# Patient Record
Sex: MALE | Race: WHITE | Employment: OTHER | URBAN - METROPOLITAN AREA
[De-identification: names, ages, dates, MRNs, and addresses within clinical notes are randomized per-mention and may not be internally consistent; named-entity substitution may affect disease eponyms.]

---

## 2017-04-20 ENCOUNTER — HOSPITAL ENCOUNTER (OUTPATIENT)
Dept: PREADMISSION TESTING | Age: 82
Discharge: HOME | End: 2017-04-20
Payer: MEDICARE

## 2017-04-20 VITALS — WEIGHT: 239 LBS | BODY MASS INDEX: 34.22 KG/M2 | HEIGHT: 70 IN

## 2017-04-20 LAB
ANION GAP BLD CALC-SCNC: 11 MMOL/L (ref 3–18)
ATRIAL RATE: 94 BPM
BUN SERPL-MCNC: 21 MG/DL (ref 7–18)
BUN/CREAT SERPL: 18 (ref 12–20)
CALCIUM SERPL-MCNC: 9.2 MG/DL (ref 8.5–10.1)
CALCULATED R AXIS, ECG10: 18 DEGREES
CALCULATED T AXIS, ECG11: 46 DEGREES
CHLORIDE SERPL-SCNC: 102 MMOL/L (ref 100–108)
CO2 SERPL-SCNC: 26 MMOL/L (ref 21–32)
CREAT SERPL-MCNC: 1.18 MG/DL (ref 0.6–1.3)
DIAGNOSIS, 93000: NORMAL
ERYTHROCYTE [DISTWIDTH] IN BLOOD BY AUTOMATED COUNT: 20.9 % (ref 11.6–14.5)
EST. AVERAGE GLUCOSE BLD GHB EST-MCNC: 120 MG/DL
GLUCOSE SERPL-MCNC: 73 MG/DL (ref 74–99)
HBA1C MFR BLD: 5.8 % (ref 4.5–5.6)
HCT VFR BLD AUTO: 48.3 % (ref 36–48)
HGB BLD-MCNC: 15.1 G/DL (ref 13–16)
MCH RBC QN AUTO: 28.4 PG (ref 24–34)
MCHC RBC AUTO-ENTMCNC: 31.3 G/DL (ref 31–37)
MCV RBC AUTO: 90.8 FL (ref 74–97)
PLATELET # BLD AUTO: 258 K/UL (ref 135–420)
PMV BLD AUTO: 9.5 FL (ref 9.2–11.8)
POTASSIUM SERPL-SCNC: 4.9 MMOL/L (ref 3.5–5.5)
Q-T INTERVAL, ECG07: 364 MS
QRS DURATION, ECG06: 106 MS
QTC CALCULATION (BEZET), ECG08: 435 MS
RBC # BLD AUTO: 5.32 M/UL (ref 4.7–5.5)
SODIUM SERPL-SCNC: 139 MMOL/L (ref 136–145)
VENTRICULAR RATE, ECG03: 86 BPM
WBC # BLD AUTO: 10.2 K/UL (ref 4.6–13.2)

## 2017-04-20 PROCEDURE — 93005 ELECTROCARDIOGRAM TRACING: CPT

## 2017-04-20 PROCEDURE — 83036 HEMOGLOBIN GLYCOSYLATED A1C: CPT | Performed by: ANESTHESIOLOGY

## 2017-04-20 PROCEDURE — 85027 COMPLETE CBC AUTOMATED: CPT | Performed by: ORTHOPAEDIC SURGERY

## 2017-04-20 PROCEDURE — 80048 BASIC METABOLIC PNL TOTAL CA: CPT | Performed by: ORTHOPAEDIC SURGERY

## 2017-04-20 RX ORDER — LOSARTAN POTASSIUM 50 MG/1
50 TABLET ORAL DAILY
COMMUNITY

## 2017-04-20 RX ORDER — LORAZEPAM 0.5 MG/1
0.5 TABLET ORAL
COMMUNITY

## 2017-04-20 RX ORDER — LANOLIN ALCOHOL/MO/W.PET/CERES
65 CREAM (GRAM) TOPICAL
COMMUNITY

## 2017-04-20 RX ORDER — GLIMEPIRIDE 1 MG/1
0.5 TABLET ORAL
COMMUNITY

## 2017-04-20 RX ORDER — CITALOPRAM 10 MG/1
10 TABLET ORAL
COMMUNITY

## 2017-04-20 RX ORDER — TIMOLOL MALEATE 5 MG/ML
1 SOLUTION/ DROPS OPHTHALMIC 2 TIMES DAILY
COMMUNITY

## 2017-04-20 RX ORDER — LEVOTHYROXINE SODIUM 100 UG/1
100 TABLET ORAL
COMMUNITY

## 2017-04-20 NOTE — PERIOP NOTES
Dr Ho Plant office called. Left message on Emilee Ascencio voicemail re:  Need a written note from Dr Zayda Valle about management instructions for Eliquis.

## 2017-04-25 PROBLEM — N40.0 BPH (BENIGN PROSTATIC HYPERPLASIA): Chronic | Status: ACTIVE | Noted: 2017-04-25

## 2017-04-25 PROBLEM — I48.91 A-FIB (HCC): Chronic | Status: ACTIVE | Noted: 2017-04-25

## 2017-04-25 PROBLEM — Z87.11 HISTORY OF GASTRIC ULCER: Chronic | Status: ACTIVE | Noted: 2017-04-25

## 2017-04-25 PROBLEM — E03.9 ACQUIRED HYPOTHYROIDISM: Chronic | Status: ACTIVE | Noted: 2017-04-25

## 2017-04-25 PROBLEM — C90.00 MULTIPLE MYELOMA (HCC): Chronic | Status: ACTIVE | Noted: 2017-04-25

## 2017-04-25 PROBLEM — D51.0 PERNICIOUS ANEMIA: Chronic | Status: ACTIVE | Noted: 2017-04-25

## 2017-04-25 PROBLEM — G56.01 CARPAL TUNNEL SYNDROME OF RIGHT WRIST: Chronic | Status: ACTIVE | Noted: 2017-04-25

## 2017-04-25 PROBLEM — I10 HTN (HYPERTENSION): Chronic | Status: ACTIVE | Noted: 2017-04-25

## 2017-04-25 PROBLEM — G62.9 PERIPHERAL NEUROPATHY: Chronic | Status: ACTIVE | Noted: 2017-04-25

## 2017-04-25 PROBLEM — E11.9 DIABETES MELLITUS TYPE 2, CONTROLLED (HCC): Chronic | Status: ACTIVE | Noted: 2017-04-25

## 2017-04-25 NOTE — H&P
History and Physical        Patient: Ej Ugarte               Sex: male          DOA: (Not on file)         YOB: 1935      Age:  [de-identified] y.o.        LOS:  LOS: 0 days        HPI:     Trever is in for followup of right-greater-than-left carpal tunnel syndrome/severe 1st [facility] DJD/generalized hand DJD/stiffness/left volar ganglion cyst/evidence of mild cubital tunnel syndrome, but he does not have this clinical symptom. AP, lateral, and oblique views of the bilateral hands were obtained and interpreted in the office and do show the obvious generalized osteoarthritis throughout both hands, worse at the 1st [facility] joint. Otherwise, there is no periosteal reaction, no medullary lesions, no osteopenia, no chondrolysis, and no fractures.     Past Medical History:   Diagnosis Date    Arthritis     Atrial fibrillation (HCC)     afib    Chronic pain     lower back    Colon cancer screening 5/3/2012    Diabetes ([facility] [address] 10yrs    GERD (gastroesophageal reflux disease)     Hypertension 10yrs    Other ill-defined conditions     enlarged prostate    Pernicious anemia     Psychiatric disorder     anxiety    PUD (peptic ulcer disease)     bleeding ulcer    Stroke ([facility] [address] 2015    TIA     Thyroid disease     underactive       Past Surgical History:   Procedure Laterality Date    ABDOMEN SURGERY PROC UNLISTED  1967    bleeding ulcer    CARDIAC SURG PROCEDURE UNLIST  11/16//09    heart cath, ablation    CARDIAC SURG PROCEDURE UNLIST  1/19/2010    cardioversion    HX CATARACT REMOVAL  6/102008    rt eye cataract    HX CATARACT REMOVAL  7/8/2008    lt eye cataract    HX GI  1967    bleeding ulcer    HX HEENT  10/1956    boken nose    HX HEENT  8/1958    boken jaw    HX HEENT      wisdom teeth    HX KNEE ARTHROSCOPY      right knee surgery x5    HX KNEE ARTHROSCOPY      left knee surgrey    HX ORTHOPAEDIC  9/2006    left shoulder  torn ligament    HX ORTHOPAEDIC  jan 2013    right foot, right toe clip tendon    HX ORTHOPAEDIC  nov 1959    broken right ankle    HX OTHER SURGICAL  2011    3 cysts infected on back       No family history on file. Social History     Social History    Marital status:      Spouse name: N/A    Number of children: N/A    Years of education: N/A     Social History Main Topics    Smoking status: Never Smoker    Smokeless tobacco: Never Used    Alcohol use No    Drug use: No    Sexual activity: Yes     Other Topics Concern    Not on file     Social History Narrative       Prior to Admission medications    Medication Sig Start Date End Date Taking? Authorizing Provider   levothyroxine (SYNTHROID) 100 mcg tablet Take 100 mcg by mouth Daily (before breakfast). Historical Provider   DOCOSAHEXANOIC ACID/EPA (FISH OIL PO) Take 1,200 mg by mouth every Monday, Wednesday, Friday. Historical Provider   TESTOSTERONE CYPIONATE IM 1.5 mL by IntraMUSCular route Once every 2 weeks. Historical Provider   LORazepam (ATIVAN) 0.5 mg tablet Take 0.5 mg by mouth daily as needed for Anxiety. Historical Provider   apixaban (ELIQUIS) 5 mg tablet Take 5 mg by mouth two (2) times a day. Historical Provider   glimepiride (AMARYL) 1 mg tablet Take 0.5 mg by mouth Daily (before breakfast). Historical Provider   losartan (COZAAR) 50 mg tablet Take 50 mg by mouth daily. Historical Provider   citalopram (CELEXA) 10 mg tablet Take 10 mg by mouth daily. Historical Provider   timolol (TIMOPTIC) 0.5 % ophthalmic solution Administer 1 Drop to right eye two (2) times a day. Historical Provider   ferrous sulfate (IRON) 325 mg (65 mg iron) tablet Take 65 mg by mouth every Monday, Wednesday, Friday. Historical Provider   pravastatin (PRAVACHOL) 40 mg tablet Take 40 mg by mouth nightly. Historical Provider   TRAVOPROST (TRAVATAN Z OP) Apply 1 Drop to eye nightly.  Both eyes    Historical Provider   acetaminophen (TYLENOL EXTRA STRENGTH) 500 mg tablet Take 1,000 mg by mouth two (2) times a day. Historical Provider   BIOTIN PO Take 1,000 mg by mouth daily. Historical Provider   lactobacillus rhamnosus gg 10 billion cell (PROBIOTIC) 10 billion cell capsule Take 1 Cap by mouth every Monday, Wednesday, Friday. Historical Provider   metFORMIN (GLUCOPHAGE) 500 mg tablet Take 1,000 mg by mouth two (2) times daily (with meals). Indications: TYPE 2 DIABETES MELLITUS    Historical Provider   omeprazole (PRILOSEC) 20 mg capsule Take 20 mg by mouth daily. Indications: GASTROESOPHAGEAL REFLUX    Historical Provider   ergocalciferol (VITAMIN D2) 50,000 unit capsule Take 50,000 Units by mouth Every Saturday. Historical Provider   celecoxib (CELEBREX) 200 mg capsule Take 200 mg by mouth daily. Historical Provider   cyanocobalamin (VITAMIN B-12) 1,000 mcg tablet 2,500 mcg by SubLINGual route daily. Historical Provider   GLUCOSAMINE HCL/CHONDR HASTINGS A NA (GLUCOSAMINE-CHONDROITIN) 750-600 mg Tab Take 2 Tabs by mouth daily. Historical Provider   coenzyme q10-vitamin e (COQ10 ) 100-100 mg-unit Cap Take 300 mg by mouth daily. Historical Provider       Allergies   Allergen Reactions    Feldene [Piroxicam] Photosensitivity    Monosodium Glutamate Nausea and Vomiting     Pt states he went into a-fib too    Prednisone Palpitations     Elevated blood sugar very high and went into a-fib    Mercurochrome [Merbromin] Rash    Adhesive Rash    Cymbalta [Duloxetine] Unknown (comments)    Oxycodone Other (comments)     Decreased apetite, insomnia, poor concentration, lasting 3 weeks after the last dose        Review of Systems    Pertinent positives include cold, anxiety, frequent urination, indigestion, joint pain, joint stiffness, nausea/vomiting, numbness/tingling, shortness of breath and varicose veins.   Pertinent negatives include blurred vision, chest pain, chills, discharge of the eyes, dizziness, double vision, fever, headache, hearing loss, heart murmur, itching of the eyes, palpitations, redness of the eyes, rheumatic fever, ringing in ears, sore throat/hoarseness, weight change, abdominal pain, bipolar disorder, bladder/kidney infection, bloody stool, blood in urine, burning sensation, changes in mood, chronic cough, depression, diarrhea, difficulty breathing, difficulty swallowing, fainting, fracture/dislocation, gas/bloating, gout, hemorrhoids, incontinence, loss of balance, memory loss, muscle weakness, pain on breathing, painful urination, psoriasis, rash/itching, Raynaud's phenomenon, rheumatoid disease, seizure disorder, sprain/strain, swelling of feet, tendonitis and wheezing. Physical Exam:      There were no vitals taken for this visit. Physical Exam:  Physical exam shows a healthy-appearing, elderly white male. The right hand has obvious significant osteoarthritic change with Alyse's and Heberden's nodes. There is an adduction thumb deformity with mild pain at the 1st [facility]. The adduction thumb deformity is associated with his arthritis, and there is some decreased wrist motion as well. Otherwise, examination shows that the patients right wrist demonstrates no obvious swelling or ecchymosis. The patient has a negative Tinel, Phalen, and direct carpal compression tests. The patient has a negative Finkelstein maneuver. The patient has a 2+ radial pulse. The patient has good  strength of the hand with normal thenar eminence tone and normal light-touch sensation at the tip of each digit.       Assessment/Plan     Principal Problem:    Carpal tunnel syndrome of right wrist (4/25/2017)    Active Problems:    Multiple myeloma ([address] (4/25/2017)      A-fib ([address] (4/25/2017)      Diabetes mellitus type 2, controlled ([address] (4/25/2017)      BPH (benign prostatic hyperplasia) (4/25/2017)      Acquired hypothyroidism (4/25/2017)      Pernicious anemia (4/25/2017)      Peripheral neuropathy ([address] (4/25/2017)      HTN (hypertension) (4/25/2017)      History of gastric ulcer (4/25/2017)      If he has continued problems with the ganglion cyst then Dr. Esthela Rain would consider getting an MRI of his wrist for surgical removal because it does have intimate association with the radial artery. In regards to the right hand, Dr. Esthela Rain talked to him about a right endoscopic carpal tunnel release. We are going to try to do this at [facility] in the near future. He issued Norco for postoperative pain use. Dr. Esthela Rain will see him again one week postop. He understands all the same risks and is willing to proceed. Once we get the right side better enough, we will discuss the left. We do not need to plan for anything involved with his arthritic change in his hands for now.

## 2017-04-26 RX ORDER — SODIUM CHLORIDE 0.9 % (FLUSH) 0.9 %
5-10 SYRINGE (ML) INJECTION AS NEEDED
Status: CANCELLED | OUTPATIENT
Start: 2017-04-26

## 2017-04-26 RX ORDER — DIPHENHYDRAMINE HCL 25 MG
25 CAPSULE ORAL
Status: CANCELLED | OUTPATIENT
Start: 2017-04-26

## 2017-04-26 RX ORDER — SODIUM CHLORIDE 0.9 % (FLUSH) 0.9 %
5-10 SYRINGE (ML) INJECTION EVERY 8 HOURS
Status: CANCELLED | OUTPATIENT
Start: 2017-04-26

## 2017-04-27 ENCOUNTER — ANESTHESIA EVENT (OUTPATIENT)
Dept: SURGERY | Age: 82
End: 2017-04-27
Payer: MEDICARE

## 2017-04-27 ENCOUNTER — ANESTHESIA (OUTPATIENT)
Dept: SURGERY | Age: 82
End: 2017-04-27
Payer: MEDICARE

## 2017-04-27 ENCOUNTER — HOSPITAL ENCOUNTER (OUTPATIENT)
Age: 82
Setting detail: OUTPATIENT SURGERY
Discharge: HOME | End: 2017-04-27
Attending: ORTHOPAEDIC SURGERY | Admitting: ORTHOPAEDIC SURGERY
Payer: MEDICARE

## 2017-04-27 VITALS
SYSTOLIC BLOOD PRESSURE: 125 MMHG | TEMPERATURE: 97.7 F | DIASTOLIC BLOOD PRESSURE: 63 MMHG | HEART RATE: 52 BPM | BODY MASS INDEX: 33.53 KG/M2 | WEIGHT: 234.2 LBS | OXYGEN SATURATION: 96 % | RESPIRATION RATE: 18 BRPM | HEIGHT: 70 IN

## 2017-04-27 LAB
GLUCOSE BLD STRIP.AUTO-MCNC: 116 MG/DL (ref 70–110)
GLUCOSE BLD STRIP.AUTO-MCNC: 129 MG/DL (ref 70–110)

## 2017-04-27 PROCEDURE — 74011250636 HC RX REV CODE- 250/636: Performed by: PHYSICIAN ASSISTANT

## 2017-04-27 PROCEDURE — 74011000250 HC RX REV CODE- 250

## 2017-04-27 PROCEDURE — 74011250636 HC RX REV CODE- 250/636

## 2017-04-27 PROCEDURE — 76010000154 HC OR TIME FIRST 0.5 HR: Performed by: ORTHOPAEDIC SURGERY

## 2017-04-27 PROCEDURE — 77030009770 HC INSTRU CRPL SET CNMD -C: Performed by: ORTHOPAEDIC SURGERY

## 2017-04-27 PROCEDURE — 77030012508 HC MSK AIRWY LMA AMBU -A: Performed by: SPECIALIST

## 2017-04-27 PROCEDURE — 74011000250 HC RX REV CODE- 250: Performed by: ORTHOPAEDIC SURGERY

## 2017-04-27 PROCEDURE — 82962 GLUCOSE BLOOD TEST: CPT

## 2017-04-27 PROCEDURE — 76210000006 HC OR PH I REC 0.5 TO 1 HR: Performed by: ORTHOPAEDIC SURGERY

## 2017-04-27 PROCEDURE — 77030020782 HC GWN BAIR PAWS FLX 3M -B: Performed by: ORTHOPAEDIC SURGERY

## 2017-04-27 PROCEDURE — 76060000031 HC ANESTHESIA FIRST 0.5 HR: Performed by: ORTHOPAEDIC SURGERY

## 2017-04-27 PROCEDURE — 76210000021 HC REC RM PH II 0.5 TO 1 HR: Performed by: ORTHOPAEDIC SURGERY

## 2017-04-27 RX ORDER — FENTANYL CITRATE 50 UG/ML
25 INJECTION, SOLUTION INTRAMUSCULAR; INTRAVENOUS
Status: DISCONTINUED | OUTPATIENT
Start: 2017-04-27 | End: 2017-04-27 | Stop reason: HOSPADM

## 2017-04-27 RX ORDER — LIDOCAINE HYDROCHLORIDE 20 MG/ML
INJECTION, SOLUTION EPIDURAL; INFILTRATION; INTRACAUDAL; PERINEURAL AS NEEDED
Status: DISCONTINUED | OUTPATIENT
Start: 2017-04-27 | End: 2017-04-27 | Stop reason: HOSPADM

## 2017-04-27 RX ORDER — SODIUM CHLORIDE, SODIUM LACTATE, POTASSIUM CHLORIDE, CALCIUM CHLORIDE 600; 310; 30; 20 MG/100ML; MG/100ML; MG/100ML; MG/100ML
100 INJECTION, SOLUTION INTRAVENOUS CONTINUOUS
Status: DISCONTINUED | OUTPATIENT
Start: 2017-04-27 | End: 2017-04-27 | Stop reason: HOSPADM

## 2017-04-27 RX ORDER — FENTANYL CITRATE 50 UG/ML
INJECTION, SOLUTION INTRAMUSCULAR; INTRAVENOUS AS NEEDED
Status: DISCONTINUED | OUTPATIENT
Start: 2017-04-27 | End: 2017-04-27 | Stop reason: HOSPADM

## 2017-04-27 RX ORDER — HYDROCODONE BITARTRATE AND ACETAMINOPHEN 5; 325 MG/1; MG/1
1-2 TABLET ORAL
Qty: 40 TAB | Refills: 0 | Status: SHIPPED
Start: 2017-04-27 | End: 2017-09-18

## 2017-04-27 RX ORDER — DEXTROSE 50 % IN WATER (D50W) INTRAVENOUS SYRINGE
25-50 AS NEEDED
Status: DISCONTINUED | OUTPATIENT
Start: 2017-04-27 | End: 2017-04-27 | Stop reason: HOSPADM

## 2017-04-27 RX ORDER — ONDANSETRON 2 MG/ML
INJECTION INTRAMUSCULAR; INTRAVENOUS AS NEEDED
Status: DISCONTINUED | OUTPATIENT
Start: 2017-04-27 | End: 2017-04-27 | Stop reason: HOSPADM

## 2017-04-27 RX ORDER — INSULIN LISPRO 100 [IU]/ML
INJECTION, SOLUTION INTRAVENOUS; SUBCUTANEOUS ONCE
Status: DISCONTINUED | OUTPATIENT
Start: 2017-04-27 | End: 2017-04-27 | Stop reason: HOSPADM

## 2017-04-27 RX ORDER — SODIUM CHLORIDE, SODIUM LACTATE, POTASSIUM CHLORIDE, CALCIUM CHLORIDE 600; 310; 30; 20 MG/100ML; MG/100ML; MG/100ML; MG/100ML
125 INJECTION, SOLUTION INTRAVENOUS CONTINUOUS
Status: DISCONTINUED | OUTPATIENT
Start: 2017-04-27 | End: 2017-04-27 | Stop reason: HOSPADM

## 2017-04-27 RX ORDER — NALOXONE HYDROCHLORIDE 0.4 MG/ML
0.2 INJECTION, SOLUTION INTRAMUSCULAR; INTRAVENOUS; SUBCUTANEOUS AS NEEDED
Status: DISCONTINUED | OUTPATIENT
Start: 2017-04-27 | End: 2017-04-27 | Stop reason: HOSPADM

## 2017-04-27 RX ORDER — SODIUM CHLORIDE 0.9 % (FLUSH) 0.9 %
5-10 SYRINGE (ML) INJECTION AS NEEDED
Status: DISCONTINUED | OUTPATIENT
Start: 2017-04-27 | End: 2017-04-27 | Stop reason: HOSPADM

## 2017-04-27 RX ORDER — BUPIVACAINE HYDROCHLORIDE 2.5 MG/ML
INJECTION, SOLUTION EPIDURAL; INFILTRATION; INTRACAUDAL AS NEEDED
Status: DISCONTINUED | OUTPATIENT
Start: 2017-04-27 | End: 2017-04-27 | Stop reason: HOSPADM

## 2017-04-27 RX ORDER — MAGNESIUM SULFATE 100 %
4 CRYSTALS MISCELLANEOUS AS NEEDED
Status: DISCONTINUED | OUTPATIENT
Start: 2017-04-27 | End: 2017-04-27 | Stop reason: HOSPADM

## 2017-04-27 RX ORDER — PROPOFOL 10 MG/ML
INJECTION, EMULSION INTRAVENOUS AS NEEDED
Status: DISCONTINUED | OUTPATIENT
Start: 2017-04-27 | End: 2017-04-27 | Stop reason: HOSPADM

## 2017-04-27 RX ADMIN — LIDOCAINE HYDROCHLORIDE 100 MG: 20 INJECTION, SOLUTION EPIDURAL; INFILTRATION; INTRACAUDAL; PERINEURAL at 08:57

## 2017-04-27 RX ADMIN — FENTANYL CITRATE 25 MCG: 50 INJECTION, SOLUTION INTRAMUSCULAR; INTRAVENOUS at 09:17

## 2017-04-27 RX ADMIN — FENTANYL CITRATE 50 MCG: 50 INJECTION, SOLUTION INTRAMUSCULAR; INTRAVENOUS at 08:53

## 2017-04-27 RX ADMIN — SODIUM CHLORIDE, SODIUM LACTATE, POTASSIUM CHLORIDE, AND CALCIUM CHLORIDE 125 ML/HR: 600; 310; 30; 20 INJECTION, SOLUTION INTRAVENOUS at 07:47

## 2017-04-27 RX ADMIN — PROPOFOL 150 MG: 10 INJECTION, EMULSION INTRAVENOUS at 08:57

## 2017-04-27 RX ADMIN — PROPOFOL 20 MG: 10 INJECTION, EMULSION INTRAVENOUS at 08:58

## 2017-04-27 RX ADMIN — FENTANYL CITRATE 25 MCG: 50 INJECTION, SOLUTION INTRAMUSCULAR; INTRAVENOUS at 09:13

## 2017-04-27 RX ADMIN — ONDANSETRON 4 MG: 2 INJECTION INTRAMUSCULAR; INTRAVENOUS at 08:53

## 2017-04-27 NOTE — OP NOTES
Endoscopic Carpal Tunnel Release    Patient: Queta Anderson MRN: [id number]  CSN: [id number]   YOB: 1935  Age: [de-identified] y.o. Sex: male      Date of Surgery:4/27/2017    PREOPERATIVE DIAGNOSES:  CARPAL TUNNEL      POSTOPERATIVE DIAGNOSES:  CARPAL TUNNEL    PROCEDURE: right Endoscpoic Carpal Tunnel Release    SURGEON: Wallace Cooper MD    SPECIMEN TO PATHOLOGY:  * No specimens in log *    ESTIMATED BLOOD LOSS: none    FINDINGS:  Same     TOURNIQUET  TIME:   Approximately 5 minutes at 613 Hg     COMPLICATIONS:  None     ANESTHESIA:  General    INDICATIONS:  A [de-identified] y.o.  male with known carpal tunnel syndrome documented by clinical exam and nerve conduction studies. The patient now presents for a ECTR. PROCEDURE: The patient was brought into the operating theater and after adequate prepping and draping of the surgical wrist and hand the limb was then exsanguinated with an Esmarch bandage and the tourniquet was inflated. .  A 1 cm transverse incision was placed at the volar flexion wrist crease centered over the palmaris longus. The incision was deepened to the antebrachial fascia which was released the length of the wound and proximally for an additional 1 cm. Using the [employer] carpal tunnel release kit, the carpal tunnel was dilated in line with the fourth metacarpal to the distal extent of the transverse carpal ligament. The cannula was then inserted and kept on some proximal radial deviation with the wrist in slight extension, and it was passed all the way to the cardinal line of Cm. The scope was then inserted with the wrist in slight extension and the undersurface of the transverse carpal ligament was seen easily from proximal to the distal fatty/adipose tissue at the end of the transverse carpal ligament.   Using the [facility] on the [facility] set and using the endoscope for visualization, the transverse carpal ligament was transected in two passes with good release of the ligament outside of view of the cannula. The nerve was never in harms way. The cannula was then withdrawn and placed back in the carpal tunnel with none of the prerelease tension. The wound was then closed with Mastisol on either side and then Steri-Strips were used to re-oppose the skin without the use of stitches. The skin and cut ends of the transverse carpal ligament were anesthetized with 4 to 5 mL of 0.25% Marcaine without epinephrine. This was dressed with two 4 x 4s and an Ace wrap. The tourniquet was deflated and removed, and the patient taken to the recovery room awake and in stable condition. All instrument, sponge and needle counts were correct.     Wallace Cooper MD  4/27/2017

## 2017-04-27 NOTE — PERIOP NOTES
[facility] med list reviewed and verified, no duplicates, with MICHAEL Robert RN.

## 2017-04-27 NOTE — INTERVAL H&P NOTE
H&P Update:  Cha Berry was seen and examined. History and physical has been reviewed. The patient has been examined. There have been no significant clinical changes since the completion of the originally dated History and Physical.  Patient identified by surgeon; surgical site was confirmed by patient and surgeon.     Signed By: Alexys Wise MD     April 27, 2017 8:38 AM

## 2017-04-27 NOTE — ANESTHESIA PREPROCEDURE EVALUATION
Anesthetic History   No history of anesthetic complications            Review of Systems / Medical History  Patient summary reviewed, nursing notes reviewed and pertinent labs reviewed    Pulmonary  Within defined limits                 Neuro/Psych       CVA  TIA and psychiatric history     Cardiovascular    Hypertension: well controlled        Dysrhythmias : atrial fibrillation           GI/Hepatic/Renal     GERD: well controlled      PUD     Endo/Other    Diabetes  Hypothyroidism  Arthritis     Other Findings              Physical Exam    Airway  Mallampati: I  TM Distance: 4 - 6 cm  Neck ROM: normal range of motion   Mouth opening: Normal     Cardiovascular    Rhythm: irregular  Rate: normal         Dental    Dentition: Caps/crowns     Pulmonary  Breath sounds clear to auscultation               Abdominal  GI exam deferred       Other Findings            Anesthetic Plan    ASA: 3  Anesthesia type: general          Induction: Intravenous  Anesthetic plan and risks discussed with: Patient

## 2017-04-27 NOTE — DISCHARGE INSTRUCTIONS
Madie Tirado III, MD Doneen Mater, PA-C    Upper Extremity Surgery  Discharge Instructions      Please take the time to review the following instructions before you leave the hospital and use them as guidelines during your recovery from surgery. If you have any questions you may contact my office at [phone]. Wound Care/Dressing Changes:    []   You may remove the bulky dressing two days after surgery. Once you remove this, no dressing is necessary if there is no drainage. [x]   You may change your dressing as needed. Beginning the 2 days after you are discharged from the hospital you should change your dressing daily. A big, bulky dressing isnt necessary as long as there is any drainage from the incisions. You can put a band-aid or a piece of gauze over each incision and wear an ACE bandage as needed for comfort and swelling. []   Dont remove your dressing or get them wet.  It isnt necessary to apply antibiotic ointment to your incisions. Sutures will be removed at your one week post-op visit. Staples (if you have them) are removed in two weeks. If you have steri-strips over your incision they will start to peel off in 7-10 days as you get them wet. They dont need to be removed prior to that. When they begin to peel off, you may remove them. They should all be removed by 14 days from your surgery. Showering/Bathing:    [x]   You may shower 2 days after your surgery. Your dressing may be removed for showering. You may get your incisions wet in the shower. Dont vigorously scrub your incisions. Apply a clean, dry dressing after you have dried your incisions. Do not take a bath or get into a swimming pool or Jacuzzi until the incisions are completely healed. This may take about 14 days. Do not soak your incision under water. Sling:    []   You are not required to wear your sling and should do so only as needed for comfort.  You have no restrictions with regards to the movement of your shoulder. Please push to achieve full range of motion as soon as possible. You may resume your normal daily activities immediately and return to work as soon as you feel appropriate.    []   Keep your arm in the immobilizer at all times except when showering and changing your clothes. When showering or changing, keep your arm at your side. Dont move it away from your body. []   Keep your arm in the immobilizer at all times except when showering, changing your clothes and doing the exercises shown to you by Dr. Glendy Benitez or your physical therapist prior to your discharge from the hospital.  Keep your arm at your side when changing your clothes and showering. Dont move it away from your body. Ice/Elevation    Continue ice consistently for 48 hours after surgery. After 48 hours, you should ice your shoulder 3 times per day, for 20 minutes at a time for the next 5 days. After one week from surgery, you may use ice as needed for pain and swelling. Diet:    You may advance to your regular diet as tolerated. Medication:    1. You will be given a prescription for pain medication when you are discharged from the hospital.  Take the medication as needed according to the directions on the prescription bottle. Possible side effects of the medication include dizziness, headache, nausea, vomiting, constipation and urinary retention. If you experience any of these side effects call the office so that we can assist you in relieving them. Discontinue the use of the pain medication if you develop itching, rash, shortness of breath or difficulties swallowing. If these symptoms become severe or arent relieved by discontinuing the medication you should seek immediate medical attention. Refills of pain medication are authorized during office hours only (8 AM-5PM Mon. thru Fri.).    2. If you were prescribed Percoset/oxycodone or Dilaudid/hydromorphone you must have a written prescription. These medications legally cannot be called in to the pharmacy. 3. You may take over the counter Ibuprofen/Advil/Aleve between dosages of your pain medication if needed. Do not take Tylenol in addition to your pain medication as most of the pain medication already contains Tylenol. Do not exceed 3000mg of Tylenol per day. Ex: (hydrocodone 5/325g= 325mg of Tylenol)  4. You may resume the medication you were taking prior to surgery. Pain medication may change the effects of any antidepressant medication you are taking. If you have any questions about possible interactions between your regular medications and the pain medication you should consult the physician who prescribes your regular medications. Follow Up Appointment:  If you are unsure of your follow-up appointment date and time, please call [phone]. Please let our  know you are scheduling a post-op appointment. Most appointments should be between 7-14 days after your surgery. Physical Therapy:    []    If you already have a therapy appointment, please be sure to attend your sessions as scheduled. []   Physical Therapy will be discussed with you at your first follow-up appointment with Dr. Josselyn Garzon. You dont need to begin physical therapy prior to that.    []  Begin physical therapy with your Home Health Physical Therapy. This will be set up         for your before you leave the hospital.    [x]  You do not require Physical Therapy. Important Signs and Symptoms:    If any of the following signs and symptoms occurs, you should contact Dr. Josselyn Garzon office. Please be advised if a problem arises which you feel requires immediate medical attention or you are unable to contact Dr. Josselyn Garzon office you should seek immediate medical attention. Signs and symptoms to watch for include:    1.  A sudden increase in swelling and/or redness or warmth at the area your surgery was performed which isnt relieved by rest, ice, and elevation. 2. Oral temperature greater than 101 degrees for 12 hours or more which isnt relieved by an increase in fluid intake and taking two Tylenol every 4-6 hours. 3. Excessive drainage from your incisions, or drainage which hasnt stopped by 72 hours after your surgery. 4. Fever, chills, shortness of breath, chest pain, nausea, vomiting or other signs and symptoms which are of concern to you. DISCHARGE SUMMARY from Nurse    The following personal items are in your possession at time of discharge:    Dental Appliances: None  Visual Aid: Glasses     Home Medications: None  Jewelry: None  Clothing: Undergarments, Socks, Footwear, Shirt, Pants (in locker #24)  Other Valuables: Eyeglasses (in locker #24)             PATIENT INSTRUCTIONS:    After general anesthesia or intravenous sedation, for 24 hours or while taking prescription Narcotics:  · Limit your activities  · Do not drive and operate hazardous machinery  · Do not make important personal or business decisions  · Do  not drink alcoholic beverages  · If you have not urinated within 8 hours after discharge, please contact your surgeon on call. Report the following to your surgeon:  · Excessive pain, swelling, redness or odor of or around the surgical area  · Temperature over 100.5  · Nausea and vomiting lasting longer than 4 hours or if unable to take medications  · Any signs of decreased circulation or nerve impairment to extremity: change in color, persistent  numbness, tingling, coldness or increase pain  · Any questions        What to do at Home:  Recommended activity: Activity as tolerated and no driving for today, Ambulate in house, No lifting, Driving, or Strenuous exercise until tolerated and No driving while on analgesics. If you experience any of the following symptoms as above, please follow up with Dr. Marsha Fleming. *  Please give a list of your current medications to your Primary Care Provider.     *  Please update this list whenever your medications are discontinued, doses are      changed, or new medications (including over-the-counter products) are added. *  Please carry medication information at all times in case of emergency situations. These are general instructions for a healthy lifestyle:    No smoking/ No tobacco products/ Avoid exposure to second hand smoke    Surgeon General's Warning:  Quitting smoking now greatly reduces serious risk to your health. Obesity, smoking, and sedentary lifestyle greatly increases your risk for illness    A healthy diet, regular physical exercise & weight monitoring are important for maintaining a healthy lifestyle    You may be retaining fluid if you have a history of heart failure or if you experience any of the following symptoms:  Weight gain of 3 pounds or more overnight or 5 pounds in a week, increased swelling in our hands or feet or shortness of breath while lying flat in bed. Please call your doctor as soon as you notice any of these symptoms; do not wait until your next office visit. Recognize signs and symptoms of STROKE:    F-face looks uneven    A-arms unable to move or move unevenly    S-speech slurred or non-existent    T-time-call 911 as soon as signs and symptoms begin-DO NOT go       Back to bed or wait to see if you get better-TIME IS BRAIN. Warning Signs of HEART ATTACK     Call 911 if you have these symptoms:   Chest discomfort. Most heart attacks involve discomfort in the center of the chest that lasts more than a few minutes, or that goes away and comes back. It can feel like uncomfortable pressure, squeezing, fullness, or pain.  Discomfort in other areas of the upper body. Symptoms can include pain or discomfort in one or both arms, the back, neck, jaw, or stomach.  Shortness of breath with or without chest discomfort.  Other signs may include breaking out in a cold sweat, nausea, or lightheadedness.   Don't wait more than five minutes to call 911 - MINUTES MATTER! Fast action can save your life. Calling 911 is almost always the fastest way to get lifesaving treatment. Emergency Medical Services staff can begin treatment when they arrive -- up to an hour sooner than if someone gets to the hospital by car. The discharge information has been reviewed with the patient and caregiver. The patient and caregiver verbalized understanding. Discharge medications reviewed with the patient and caregiver and appropriate educational materials and side effects teaching were provided.       Patient armband removed and shredded

## 2017-04-27 NOTE — IP AVS SNAPSHOT
[address] [address] 
[phone] Patient: Billy Williamson MRN: [id number] You are allergic to the following Allergen Reactions Feldene (Piroxicam) Photosensitivity Monosodium Glutamate Nausea and Vomiting Pt states he went into a-fib too Prednisone Palpitations Elevated blood sugar very high and went into a-fib Mercurochrome (Merbromin) Rash Adhesive Rash Cymbalta (Duloxetine) Unknown (comments) Oxycodone Other (comments) Decreased apetite, insomnia, poor concentration, lasting 3 weeks after the last dose Recent Documentation Height Weight BMI Smoking Status 1.778 m 106.2 kg 33.6 kg/m2 Never Smoker Emergency Contacts Name Discharge Info Relation Home Work Mobile [address] CAREGIVER [3] Spouse [3] [phone] About your hospitalization You were admitted on:  April 27, 2017 You last received care in the:  [facility] PACU You were discharged on:  April 27, 2017 Unit phone number:  [phone] Why you were hospitalized Your primary diagnosis was:  Carpal Tunnel Syndrome Of Right Wrist  
 Your diagnoses also included:  Multiple Myeloma (Hcc), A-Fib (Hcc), Diabetes Mellitus Type 2, Controlled (Hcc), Bph (Benign Prostatic Hyperplasia), Acquired Hypothyroidism, Pernicious Anemia, Peripheral Neuropathy (Hcc), Htn (Hypertension), History Of Gastric Ulcer Providers Seen During Your Hospitalizations Provider Role Specialty Primary office phone Shauna Jonas MD Attending Provider Orthopedic Surgery [phone] Your Primary Care Physician (PCP) Primary Care Physician Office Phone Office Fax [address], [address] [facility] [phone] Follow-up Information Follow up With Details Comments Contact Info Federico Les, [address] [facility] 
[phone] Current Discharge Medication List  
  
START taking these medications Dose & Instructions Dispensing Information Comments Morning Noon Evening Bedtime HYDROcodone-acetaminophen 5-325 mg per tablet Commonly known as:  Chai Ceballos Your last dose was: Your next dose is:    
   
   
 Dose:  1-2 Tab Take 1-2 Tabs by mouth every four (4) hours as needed for Pain. Max Daily Amount: 12 Tabs. Quantity:  40 Tab Refills:  0 CONTINUE these medications which have NOT CHANGED Dose & Instructions Dispensing Information Comments Morning Noon Evening Bedtime BIOTIN PO Your last dose was: Your next dose is:    
   
   
 Dose:  1000 mg Take 1,000 mg by mouth daily. Refills:  0 CeleBREX 200 mg capsule Generic drug:  celecoxib Your last dose was: Your next dose is:    
   
   
 Dose:  200 mg Take 200 mg by mouth daily. Refills:  0  
     
   
   
   
  
 citalopram 10 mg tablet Commonly known as:  Hernandez Rivas Your last dose was: Your next dose is:    
   
   
 Dose:  10 mg Take 10 mg by mouth daily. Refills:  0  
     
   
   
   
  
 COQ10  100-100 mg-unit Cap Generic drug:  coenzyme q10-vitamin e Your last dose was: Your next dose is:    
   
   
 Dose:  300 mg Take 300 mg by mouth daily. Refills:  0  
     
   
   
   
  
 ELIQUIS 5 mg tablet Generic drug:  apixaban Your last dose was: Your next dose is:    
   
   
 Dose:  5 mg Take 5 mg by mouth two (2) times a day. Refills:  0  
     
   
   
   
  
 FISH OIL PO Your last dose was: Your next dose is:    
   
   
 Dose:  1200 mg Take 1,200 mg by mouth every Monday, Wednesday, Friday. Refills:  0  
     
   
   
   
  
 glimepiride 1 mg tablet Commonly known as:  AMARYL Your last dose was: Your next dose is:    
   
   
 Dose:  0.5 mg Take 0.5 mg by mouth Daily (before breakfast). Refills:  0  
     
   
   
   
  
 glucosamine-chondroitin 750-600 mg Tab Your last dose was: Your next dose is:    
   
   
 Dose:  2 Tab Take 2 Tabs by mouth daily. Refills:  0 Iron 325 mg (65 mg iron) tablet Generic drug:  ferrous sulfate Your last dose was: Your next dose is:    
   
   
 Dose:  65 mg Take 65 mg by mouth every Monday, Wednesday, Friday. Refills:  0  
     
   
   
   
  
 levothyroxine 100 mcg tablet Commonly known as:  SYNTHROID Your last dose was: Your next dose is:    
   
   
 Dose:  100 mcg Take 100 mcg by mouth Daily (before breakfast). Refills:  0 LORazepam 0.5 mg tablet Commonly known as:  ATIVAN Your last dose was: Your next dose is:    
   
   
 Dose:  0.5 mg Take 0.5 mg by mouth daily as needed for Anxiety. Refills:  0  
     
   
   
   
  
 losartan 50 mg tablet Commonly known as:  COZAAR Your last dose was: Your next dose is:    
   
   
 Dose:  50 mg Take 50 mg by mouth daily. Refills:  0  
     
   
   
   
  
 metFORMIN 500 mg tablet Commonly known as:  GLUCOPHAGE Your last dose was: Your next dose is:    
   
   
 Dose:  1000 mg Take 1,000 mg by mouth two (2) times daily (with meals). Indications: TYPE 2 DIABETES MELLITUS Refills:  0  
     
   
   
   
  
 omeprazole 20 mg capsule Commonly known as:  PRILOSEC Your last dose was: Your next dose is:    
   
   
 Dose:  20 mg Take 20 mg by mouth daily. Indications: GASTROESOPHAGEAL REFLUX Refills:  0  
     
   
   
   
  
 pravastatin 40 mg tablet Commonly known as:  PRAVACHOL Your last dose was:     
   
Your next dose is:    
   
   
 Dose:  40 mg  
 Take 40 mg by mouth nightly. Refills:  0 PROBIOTIC 10 billion cell capsule Generic drug:  lactobacillus rhamnosus gg 10 billion cell Your last dose was: Your next dose is:    
   
   
 Dose:  1 Cap Take 1 Cap by mouth every Monday, Wednesday, Friday. Refills:  0  
     
   
   
   
  
 TESTOSTERONE CYPIONATE IM Your last dose was: Your next dose is:    
   
   
 Dose:  1.5 mL  
1.5 mL by IntraMUSCular route Once every 2 weeks. Refills:  0  
     
   
   
   
  
 timolol 0.5 % ophthalmic solution Commonly known as:  TIMOPTIC Your last dose was: Your next dose is:    
   
   
 Dose:  1 Drop Administer 1 Drop to right eye daily. Refills:  0  
     
   
   
   
  
 TRAVATAN Z OP Your last dose was: Your next dose is:    
   
   
 Dose:  1 Drop Apply 1 Drop to eye nightly. Both eyes Refills:  0  
     
   
   
   
  
 VITAMIN B-12 1,000 mcg tablet Generic drug:  cyanocobalamin Your last dose was: Your next dose is:    
   
   
 Dose:  2500 mcg  
2,500 mcg by SubLINGual route daily. Refills:  0  
     
   
   
   
  
 VITAMIN D2 50,000 unit capsule Generic drug:  ergocalciferol Your last dose was: Your next dose is:    
   
   
 Dose:  49330 Units Take 50,000 Units by mouth Every Saturday. Refills:  0 STOP taking these medications TYLENOL EXTRA STRENGTH 500 mg tablet Generic drug:  acetaminophen Where to Get Your Medications Information on where to get these meds will be given to you by the nurse or doctor. ! Ask your nurse or doctor about these medications HYDROcodone-acetaminophen 5-325 mg per tablet Discharge Instructions Michael Jean Baptiste III, MD Rushie Reedy, PA-C Upper Extremity Surgery Discharge Instructions Please take the time to review the following instructions before you leave the hospital and use them as guidelines during your recovery from surgery. If you have any questions you may contact my office at [phone]. Wound Care/Dressing Changes: You may remove the bulky dressing two days after surgery. Once you remove this, no dressing is necessary if there is no drainage. You may change your dressing as needed. Beginning the 2 days after you are discharged from the hospital you should change your dressing daily. A big, bulky dressing isnt necessary as long as there is any drainage from the incisions. You can put a band-aid or a piece of gauze over each incision and wear an ACE bandage as needed for comfort and swelling. Dont remove your dressing or get them wet. ? It isnt necessary to apply antibiotic ointment to your incisions. Sutures will be removed at your one week post-op visit. Staples (if you have them) are removed in two weeks. If you have steri-strips over your incision they will start to peel off in 7-10 days as you get them wet. They dont need to be removed prior to that. When they begin to peel off, you may remove them. They should all be removed by 14 days from your surgery. Showering/Bathing: You may shower 2 days after your surgery. Your dressing may be removed for showering. You may get your incisions wet in the shower. Dont vigorously scrub your incisions. Apply a clean, dry dressing after you have dried your incisions. Do not take a bath or get into a swimming pool or Jacuzzi until the incisions are completely healed. This may take about 14 days. Do not soak your incision under water. Sling: You are not required to wear your sling and should do so only as needed for comfort. You have no restrictions with regards to the movement of your shoulder.   Please push to achieve full range of motion as soon as possible. You may resume your normal daily activities immediately and return to work as soon as you feel appropriate. Keep your arm in the immobilizer at all times except when showering and changing your clothes. When showering or changing, keep your arm at your side. Dont move it away from your body. Keep your arm in the immobilizer at all times except when showering, changing your clothes and doing the exercises shown to you by Dr. Antonio Alvarez or your physical therapist prior to your discharge from the hospital.  Keep your arm at your side when changing your clothes and showering. Dont move it away from your body. Ice/Elevation Continue ice consistently for 48 hours after surgery. After 48 hours, you should ice your shoulder 3 times per day, for 20 minutes at a time for the next 5 days. After one week from surgery, you may use ice as needed for pain and swelling. Diet: 
 
You may advance to your regular diet as tolerated. Medication: 
 
1. You will be given a prescription for pain medication when you are discharged from the hospital.  Take the medication as needed according to the directions on the prescription bottle. Possible side effects of the medication include dizziness, headache, nausea, vomiting, constipation and urinary retention. If you experience any of these side effects call the office so that we can assist you in relieving them. Discontinue the use of the pain medication if you develop itching, rash, shortness of breath or difficulties swallowing. If these symptoms become severe or arent relieved by discontinuing the medication you should seek immediate medical attention. Refills of pain medication are authorized during office hours only (8 AM-5PM Mon. thru Fri.). 2. If you were prescribed Percoset/oxycodone or Dilaudid/hydromorphone you must have a written prescription. These medications legally cannot be called in to the pharmacy. 3. You may take over the counter Ibuprofen/Advil/Aleve between dosages of your pain medication if needed. Do not take Tylenol in addition to your pain medication as most of the pain medication already contains Tylenol. Do not exceed 3000mg of Tylenol per day. Ex: (hydrocodone 5/325g= 325mg of Tylenol) 4. You may resume the medication you were taking prior to surgery. Pain medication may change the effects of any antidepressant medication you are taking. If you have any questions about possible interactions between your regular medications and the pain medication you should consult the physician who prescribes your regular medications. Follow Up Appointment: If you are unsure of your follow-up appointment date and time, please call [phone]. Please let our  know you are scheduling a post-op appointment. Most appointments should be between 7-14 days after your surgery. Physical Therapy: If you already have a therapy appointment, please be sure to attend your sessions as scheduled. Physical Therapy will be discussed with you at your first follow-up appointment with Dr. Nafisa Pineda. You dont need to begin physical therapy prior to that. Begin physical therapy with your Home Health Physical Therapy. This will be set up         for your before you leave the hospital. 
 
  You do not require Physical Therapy. Important Signs and Symptoms: 
 
If any of the following signs and symptoms occurs, you should contact Dr. Nafisa Pineda office. Please be advised if a problem arises which you feel requires immediate medical attention or you are unable to contact Dr. Nafisa Pineda office you should seek immediate medical attention. Signs and symptoms to watch for include: 1. A sudden increase in swelling and/or redness or warmth at the area your surgery was performed which isnt relieved by rest, ice, and elevation.  
2. Oral temperature greater than 101 degrees for 12 hours or more which isnt relieved by an increase in fluid intake and taking two Tylenol every 4-6 hours. 3. Excessive drainage from your incisions, or drainage which hasnt stopped by 72 hours after your surgery. 4. Fever, chills, shortness of breath, chest pain, nausea, vomiting or other signs and symptoms which are of concern to you. DISCHARGE SUMMARY from Nurse The following personal items are in your possession at time of discharge: 
 
Dental Appliances: None Visual Aid: Glasses Home Medications: None Jewelry: None Clothing: Undergarments, Socks, Footwear, Shirt, Pants (in locker #24) Other Valuables: Eyeglasses (in locker #24) PATIENT INSTRUCTIONS: 
 
After general anesthesia or intravenous sedation, for 24 hours or while taking prescription Narcotics: · Limit your activities · Do not drive and operate hazardous machinery · Do not make important personal or business decisions · Do  not drink alcoholic beverages · If you have not urinated within 8 hours after discharge, please contact your surgeon on call. Report the following to your surgeon: 
· Excessive pain, swelling, redness or odor of or around the surgical area · Temperature over 100.5 · Nausea and vomiting lasting longer than 4 hours or if unable to take medications · Any signs of decreased circulation or nerve impairment to extremity: change in color, persistent  numbness, tingling, coldness or increase pain · Any questions What to do at Home: 
Recommended activity: Activity as tolerated and no driving for today, Ambulate in house, No lifting, Driving, or Strenuous exercise until tolerated and No driving while on analgesics. If you experience any of the following symptoms as above, please follow up with Dr. Divya Palencia. *  Please give a list of your current medications to your Primary Care Provider.  
 
*  Please update this list whenever your medications are discontinued, doses are 
 changed, or new medications (including over-the-counter products) are added. *  Please carry medication information at all times in case of emergency situations. These are general instructions for a healthy lifestyle: No smoking/ No tobacco products/ Avoid exposure to second hand smoke Surgeon General's Warning:  Quitting smoking now greatly reduces serious risk to your health. Obesity, smoking, and sedentary lifestyle greatly increases your risk for illness A healthy diet, regular physical exercise & weight monitoring are important for maintaining a healthy lifestyle You may be retaining fluid if you have a history of heart failure or if you experience any of the following symptoms:  Weight gain of 3 pounds or more overnight or 5 pounds in a week, increased swelling in our hands or feet or shortness of breath while lying flat in bed. Please call your doctor as soon as you notice any of these symptoms; do not wait until your next office visit. Recognize signs and symptoms of STROKE: 
 
F-face looks uneven A-arms unable to move or move unevenly S-speech slurred or non-existent T-time-call 911 as soon as signs and symptoms begin-DO NOT go Back to bed or wait to see if you get better-TIME IS BRAIN. Warning Signs of HEART ATTACK Call 911 if you have these symptoms: 
? Chest discomfort. Most heart attacks involve discomfort in the center of the chest that lasts more than a few minutes, or that goes away and comes back. It can feel like uncomfortable pressure, squeezing, fullness, or pain. ? Discomfort in other areas of the upper body. Symptoms can include pain or discomfort in one or both arms, the back, neck, jaw, or stomach. ? Shortness of breath with or without chest discomfort. ? Other signs may include breaking out in a cold sweat, nausea, or lightheadedness. Don't wait more than five minutes to call [address]!  Fast action can save your life. Calling 911 is almost always the fastest way to get lifesaving treatment. Emergency Medical Services staff can begin treatment when they arrive  up to an hour sooner than if someone gets to the hospital by car. The discharge information has been reviewed with the patient and caregiver. The patient and caregiver verbalized understanding. Discharge medications reviewed with the patient and caregiver and appropriate educational materials and side effects teaching were provided. Patient armband removed and shredded Discharge Orders None Introducing [facility]! [employer] introduces [employer] patient portal. Now you can access parts of your medical record, email your doctor's office, and request medication refills online. 1. In your internet browser, go to [email or web address] 2. Click on the First Time User? Click Here link in the Sign In box. You will see the New Member Sign Up page. 3. Enter your [employer] Access Code exactly as it appears below. You will not need to use this code after youve completed the sign-up process. If you do not sign up before the expiration date, you must request a new code. · [employer] Access Code: [email or web address] Expires: 7/17/2017  6:42 PM 
 
4. Enter the last four digits of your Social Security Number (xxxx) and Date of Birth (mm/dd/yyyy) as indicated and click Submit. You will be taken to the next sign-up page. 5. Create a [employer] ID. This will be your [employer] login ID and cannot be changed, so think of one that is secure and easy to remember. 6. Create a [employer] password. You can change your password at any time. 7. Enter your Password Reset Question and Answer. This can be used at a later time if you forget your password. 8. Enter your e-mail address. You will receive e-mail notification when new information is available in [address] 9. Click Sign Up. You can now view and download portions of your medical record. 10. Click the Download Summary menu link to download a portable copy of your medical information. If you have questions, please visit the Frequently Asked Questions section of the [employer] website. Remember, [employer] is NOT to be used for urgent needs. For medical emergencies, dial 911. Now available from your iPhone and Android! General Information Please provide this summary of care documentation to your next provider. Patient Signature:  ____________________________________________________________ Date:  ____________________________________________________________  
  
Corado Ledger Provider Signature:  ____________________________________________________________ Date:  ____________________________________________________________

## 2017-04-27 NOTE — ANESTHESIA POSTPROCEDURE EVALUATION
Post-Anesthesia Evaluation and Assessment    Cardiovascular Function/Vital Signs  Visit Vitals    /76    Pulse 80    Temp 36.7 °C (98 °F)    Resp 21    Ht 5' 10\" (1.778 m)    Wt 106.2 kg (234 lb 3.2 oz)    SpO2 97%    BMI 33.6 kg/m2       Patient is status post Procedure(s):  RIGHT ENDOSCOPIC CARPAL TUNNEL RELEASE - SPEC POP. Nausea/Vomiting: Controlled. Postoperative hydration reviewed and adequate. Pain:  Pain Scale 1: Numeric (0 - 10) (04/27/17 0945)  Pain Intensity 1: 0 (04/27/17 0945)   Managed. Neurological Status:   Neuro (WDL): Exceptions to WDL (04/27/17 0945)   At baseline. Mental Status and Level of Consciousness: Baseline and stable. Pulmonary Status:   O2 Device: Room air (04/27/17 0945)   Adequate oxygenation and airway patent. Complications related to anesthesia: None    Post-anesthesia assessment completed. No concerns. Patient has met all discharge requirements.     Signed By: German Pedro MD

## 2017-05-16 ENCOUNTER — HOSPITAL ENCOUNTER (OUTPATIENT)
Dept: PREADMISSION TESTING | Age: 82
Discharge: HOME | End: 2017-05-16
Payer: MEDICARE

## 2017-05-16 LAB
ANION GAP BLD CALC-SCNC: 9 MMOL/L (ref 3–18)
BUN SERPL-MCNC: 16 MG/DL (ref 7–18)
BUN/CREAT SERPL: 15 (ref 12–20)
CALCIUM SERPL-MCNC: 8.5 MG/DL (ref 8.5–10.1)
CHLORIDE SERPL-SCNC: 101 MMOL/L (ref 100–108)
CO2 SERPL-SCNC: 29 MMOL/L (ref 21–32)
CREAT SERPL-MCNC: 1.04 MG/DL (ref 0.6–1.3)
GLUCOSE SERPL-MCNC: 127 MG/DL (ref 74–99)
HCT VFR BLD AUTO: 43.3 % (ref 36–48)
HGB BLD-MCNC: 13.9 G/DL (ref 13–16)
POTASSIUM SERPL-SCNC: 3.9 MMOL/L (ref 3.5–5.5)
SODIUM SERPL-SCNC: 139 MMOL/L (ref 136–145)

## 2017-05-16 PROCEDURE — 80048 BASIC METABOLIC PNL TOTAL CA: CPT | Performed by: ORTHOPAEDIC SURGERY

## 2017-05-16 PROCEDURE — 85018 HEMOGLOBIN: CPT | Performed by: ORTHOPAEDIC SURGERY

## 2017-05-16 PROCEDURE — 36415 COLL VENOUS BLD VENIPUNCTURE: CPT | Performed by: ORTHOPAEDIC SURGERY

## 2017-05-24 ENCOUNTER — ANESTHESIA EVENT (OUTPATIENT)
Dept: SURGERY | Age: 82
End: 2017-05-24
Payer: MEDICARE

## 2017-05-24 PROBLEM — G56.02 CARPAL TUNNEL SYNDROME OF LEFT WRIST: Chronic | Status: ACTIVE | Noted: 2017-05-24

## 2017-05-24 RX ORDER — SODIUM CHLORIDE 0.9 % (FLUSH) 0.9 %
5-10 SYRINGE (ML) INJECTION EVERY 8 HOURS
Status: CANCELLED | OUTPATIENT
Start: 2017-05-24

## 2017-05-24 RX ORDER — SODIUM CHLORIDE 0.9 % (FLUSH) 0.9 %
5-10 SYRINGE (ML) INJECTION AS NEEDED
Status: CANCELLED | OUTPATIENT
Start: 2017-05-24

## 2017-05-24 RX ORDER — DIPHENHYDRAMINE HCL 25 MG
25 CAPSULE ORAL
Status: CANCELLED | OUTPATIENT
Start: 2017-05-24

## 2017-05-24 NOTE — H&P
History and Physical        Patient: Aaliyah Serrano               Sex: male          DOA: (Not on file)         YOB: 1935      Age:  [de-identified] y.o.        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 He is doing really well status post his right ECTR. He is very happy with his result and he feels as though it is \"100 percent\". He is ready to schedule for his left endoscopic carpal tunnel release. He reports that the ganglion cyst is actively bothering him and that the DJD in his hands is a chronic problem. AP, lateral, and oblique views of the bilateral hands were obtained and interpreted in the office and do show the obvious generalized osteoarthritis throughout both hands, worse at the 1st [facility] joint. Otherwise, there is no periosteal reaction, no medullary lesions, no osteopenia, no chondrolysis, and no fractures.   Past Medical History:   Diagnosis Date    Arthritis     Atrial fibrillation (HCC)     afib    Chronic pain     lower back    Colon cancer screening 5/3/2012    Diabetes ([facility] [address] 10yrs    GERD (gastroesophageal reflux disease)     Hypertension 10yrs    Other ill-defined conditions     enlarged prostate    Pernicious anemia     Psychiatric disorder     anxiety    PUD (peptic ulcer disease)     bleeding ulcer    Stroke ([address] 2015    TIA     Thyroid disease     underactive       Past Surgical History:   Procedure Laterality Date    ABDOMEN SURGERY PROC UNLISTED  1967    bleeding ulcer    CARDIAC SURG PROCEDURE UNLIST  11/16//09    heart cath, ablation    CARDIAC SURG PROCEDURE UNLIST  1/19/2010    cardioversion    HX CATARACT REMOVAL  6/102008    rt eye cataract    HX CATARACT REMOVAL 7/8/2008    lt eye cataract    HX GI  1967    bleeding ulcer    HX HEENT  10/1956    boken nose    HX HEENT  8/1958    boken jaw    HX HEENT      wisdom teeth    HX KNEE ARTHROSCOPY      right knee surgery x5    HX KNEE ARTHROSCOPY      left knee surgrey    HX ORTHOPAEDIC  9/2006    left shoulder  torn ligament    HX ORTHOPAEDIC  jan 2013    right foot, right toe clip tendon    HX ORTHOPAEDIC  nov 1959    broken right ankle    HX ORTHOPAEDIC Right     carpal tunnel     HX OTHER SURGICAL  2011    3 cysts infected on back       No family history on file. Social History     Social History    Marital status:      Spouse name: N/A    Number of children: N/A    Years of education: N/A     Social History Main Topics    Smoking status: Never Smoker    Smokeless tobacco: Never Used    Alcohol use No    Drug use: No    Sexual activity: Yes     Other Topics Concern    Not on file     Social History Narrative       Prior to Admission medications    Medication Sig Start Date End Date Taking? Authorizing Provider   HYDROcodone-acetaminophen (NORCO) 5-325 mg per tablet Take 1-2 Tabs by mouth every four (4) hours as needed for Pain. Max Daily Amount: 12 Tabs. 4/27/17   Sarah Arriaga PA-C   levothyroxine (SYNTHROID) 100 mcg tablet Take 100 mcg by mouth Daily (before breakfast). Historical Provider   DOCOSAHEXANOIC ACID/EPA (FISH OIL PO) Take 1,200 mg by mouth every Monday, Wednesday, Friday. Historical Provider   TESTOSTERONE CYPIONATE IM 1.5 mL by IntraMUSCular route Once every 2 weeks. Historical Provider   LORazepam (ATIVAN) 0.5 mg tablet Take 0.5 mg by mouth daily as needed for Anxiety. Historical Provider   apixaban (ELIQUIS) 5 mg tablet Take 5 mg by mouth two (2) times a day. Historical Provider   glimepiride (AMARYL) 1 mg tablet Take 0.5 mg by mouth Daily (before breakfast). Historical Provider   losartan (COZAAR) 50 mg tablet Take 50 mg by mouth daily.     Historical Provider   citalopram (CELEXA) 10 mg tablet Take 10 mg by mouth daily (after dinner). Historical Provider   timolol (TIMOPTIC) 0.5 % ophthalmic solution Administer 1 Drop to right eye daily. Historical Provider   ferrous sulfate (IRON) 325 mg (65 mg iron) tablet Take 65 mg by mouth every Monday, Wednesday, Friday. Historical Provider   pravastatin (PRAVACHOL) 40 mg tablet Take 40 mg by mouth nightly. Historical Provider   TRAVOPROST (TRAVATAN Z OP) Apply 1 Drop to eye nightly. Both eyes    Historical Provider   BIOTIN PO Take 1,000 mg by mouth daily. Historical Provider   lactobacillus rhamnosus gg 10 billion cell (PROBIOTIC) 10 billion cell capsule Take 1 Cap by mouth every Monday, Wednesday, Friday. Historical Provider   metFORMIN (GLUCOPHAGE) 500 mg tablet Take 1,000 mg by mouth two (2) times daily (with meals). Indications: TYPE 2 DIABETES MELLITUS    Historical Provider   omeprazole (PRILOSEC) 20 mg capsule Take 20 mg by mouth daily. Indications: GASTROESOPHAGEAL REFLUX    Historical Provider   ergocalciferol (VITAMIN D2) 50,000 unit capsule Take 50,000 Units by mouth Every Saturday. Historical Provider   celecoxib (CELEBREX) 200 mg capsule Take 200 mg by mouth daily. Historical Provider   cyanocobalamin (VITAMIN B-12) 1,000 mcg tablet 2,500 mcg by SubLINGual route daily. Historical Provider   GLUCOSAMINE HCL/CHONDR HASTINGS A NA (GLUCOSAMINE-CHONDROITIN) 750-600 mg Tab Take 2 Tabs by mouth daily. Historical Provider   coenzyme q10-vitamin e (COQ10 ) 100-100 mg-unit Cap Take 300 mg by mouth daily.       Historical Provider       Allergies   Allergen Reactions    Feldene [Piroxicam] Photosensitivity    Monosodium Glutamate Nausea and Vomiting     Pt states he went into a-fib too    Prednisone Palpitations     Elevated blood sugar very high and went into a-fib    Mercurochrome [Merbromin] Rash    Adhesive Rash    Cymbalta [Duloxetine] Unknown (comments)    Oxycodone Other (comments)     Decreased apetite, insomnia, poor concentration, lasting 3 weeks after the last dose        Review of Systems    Pertinent positives include cold, anxiety, frequent urination, indigestion, joint pain, joint stiffness, nausea/vomiting, numbness/tingling, shortness of breath and varicose veins. Pertinent negatives include blurred vision, chest pain, chills, discharge of the eyes, dizziness, double vision, fever, headache, hearing loss, heart murmur, itching of the eyes, palpitations, redness of the eyes, rheumatic fever, ringing in ears, sore throat/hoarseness, weight change, abdominal pain, bipolar disorder, bladder/kidney infection, bloody stool, blood in urine, burning sensation, changes in mood, chronic cough, depression, diarrhea, difficulty breathing, difficulty swallowing, fainting, fracture/dislocation, gas/bloating, gout, hemorrhoids, incontinence, loss of balance, memory loss, muscle weakness, pain on breathing, painful urination, psoriasis, rash/itching, Raynaud's phenomenon, rheumatoid disease, seizure disorder, sprain/strain, swelling of feet, tendonitis and wheezing. Physical Exam:      There were no vitals taken for this visit. Physical Exam:    Exam of the left hand shows obvious arthritic change with Alyse's and Heberden's nodes. There is an adduction deformity with mild pain at the 1st [facility] joint. He has the volar ganglion cyst very close to the radial artery. Physical examination shows that the patients left wrist demonstrates no obvious swelling, ecchymosis, or wounds noted. The patient has normal motion in all six directions. The patient has a negative Tinel, Phalen, and direct carpal compression tests. The patient has a negative Finkelstein maneuver. The patient has a 2+ radial pulse. The patient has good  strength of the hand with normal thenar eminence tone and normal light-touch sensation at the tip of each digit.         Assessment/Plan     Principal Problem:    Carpal tunnel syndrome of left wrist (5/24/2017)    Active Problems:    Multiple myeloma ([facility] [address] (4/25/2017)      A-fib ([address] (4/25/2017)      Diabetes mellitus type 2, controlled ([address] (4/25/2017)      BPH (benign prostatic hyperplasia) (4/25/2017)      Acquired hypothyroidism (4/25/2017)      Pernicious anemia (4/25/2017)      Peripheral neuropathy ([address] (4/25/2017)      HTN (hypertension) (4/25/2017)      History of gastric ulcer (4/25/2017)      He is going to be scheduled for left endoscopic carpal tunnel release to be done by Dr. Giselle Rangel. He understands these risks associated with the procedure including blood clots, anesthetic reaction, bleeding, neurovascular compromise, need for future surgery amongst others, and he agrees to this as before. He understands that he has a little bit of cubital tunnel syndrome and this will not be addressed at the time of this procedure and may continue to bother him. The arthritis is symptomatic although he does not want anything done about that just yet. He also says he is going to follow up with Dr. Servando Kocher regarding his low back pain. Risks and precautions were reviewed and all questions were answered. He has a prescription for Norco from his previous surgery that he did not use. We may need to get an MRI of the ganglion cyst at some point in the future because of its proximity to his radial artery.

## 2017-05-25 ENCOUNTER — ANESTHESIA (OUTPATIENT)
Dept: SURGERY | Age: 82
End: 2017-05-25
Payer: MEDICARE

## 2017-05-25 ENCOUNTER — HOSPITAL ENCOUNTER (OUTPATIENT)
Age: 82
Setting detail: OUTPATIENT SURGERY
Discharge: HOME | End: 2017-05-25
Attending: ORTHOPAEDIC SURGERY | Admitting: ORTHOPAEDIC SURGERY
Payer: MEDICARE

## 2017-05-25 VITALS
HEIGHT: 70 IN | BODY MASS INDEX: 34.53 KG/M2 | SYSTOLIC BLOOD PRESSURE: 132 MMHG | TEMPERATURE: 97.4 F | RESPIRATION RATE: 18 BRPM | DIASTOLIC BLOOD PRESSURE: 62 MMHG | OXYGEN SATURATION: 97 % | HEART RATE: 73 BPM | WEIGHT: 241.19 LBS

## 2017-05-25 LAB
GLUCOSE BLD STRIP.AUTO-MCNC: 127 MG/DL (ref 70–110)
GLUCOSE BLD STRIP.AUTO-MCNC: 133 MG/DL (ref 70–110)

## 2017-05-25 PROCEDURE — 76210000006 HC OR PH I REC 0.5 TO 1 HR: Performed by: ORTHOPAEDIC SURGERY

## 2017-05-25 PROCEDURE — 77030020782 HC GWN BAIR PAWS FLX 3M -B: Performed by: ORTHOPAEDIC SURGERY

## 2017-05-25 PROCEDURE — 74011250636 HC RX REV CODE- 250/636

## 2017-05-25 PROCEDURE — 76010000154 HC OR TIME FIRST 0.5 HR: Performed by: ORTHOPAEDIC SURGERY

## 2017-05-25 PROCEDURE — 74011000250 HC RX REV CODE- 250

## 2017-05-25 PROCEDURE — 74011250636 HC RX REV CODE- 250/636: Performed by: ANESTHESIOLOGY

## 2017-05-25 PROCEDURE — 82962 GLUCOSE BLOOD TEST: CPT

## 2017-05-25 PROCEDURE — 76060000031 HC ANESTHESIA FIRST 0.5 HR: Performed by: ORTHOPAEDIC SURGERY

## 2017-05-25 PROCEDURE — 77030009770 HC INSTRU CRPL SET CNMD -C: Performed by: ORTHOPAEDIC SURGERY

## 2017-05-25 PROCEDURE — 74011000250 HC RX REV CODE- 250: Performed by: ORTHOPAEDIC SURGERY

## 2017-05-25 PROCEDURE — 76210000021 HC REC RM PH II 0.5 TO 1 HR: Performed by: ORTHOPAEDIC SURGERY

## 2017-05-25 PROCEDURE — 77030012508 HC MSK AIRWY LMA AMBU -A: Performed by: ANESTHESIOLOGY

## 2017-05-25 RX ORDER — ACETAMINOPHEN 500 MG
1000 TABLET ORAL 2 TIMES DAILY
COMMUNITY
End: 2017-05-25

## 2017-05-25 RX ORDER — SODIUM CHLORIDE, SODIUM LACTATE, POTASSIUM CHLORIDE, CALCIUM CHLORIDE 600; 310; 30; 20 MG/100ML; MG/100ML; MG/100ML; MG/100ML
125 INJECTION, SOLUTION INTRAVENOUS CONTINUOUS
Status: DISCONTINUED | OUTPATIENT
Start: 2017-05-25 | End: 2017-05-25 | Stop reason: HOSPADM

## 2017-05-25 RX ORDER — ONDANSETRON 2 MG/ML
INJECTION INTRAMUSCULAR; INTRAVENOUS AS NEEDED
Status: DISCONTINUED | OUTPATIENT
Start: 2017-05-25 | End: 2017-05-25 | Stop reason: HOSPADM

## 2017-05-25 RX ORDER — DEXTROSE 50 % IN WATER (D50W) INTRAVENOUS SYRINGE
25-50 AS NEEDED
Status: DISCONTINUED | OUTPATIENT
Start: 2017-05-25 | End: 2017-05-25 | Stop reason: HOSPADM

## 2017-05-25 RX ORDER — SODIUM CHLORIDE, SODIUM LACTATE, POTASSIUM CHLORIDE, CALCIUM CHLORIDE 600; 310; 30; 20 MG/100ML; MG/100ML; MG/100ML; MG/100ML
125 INJECTION, SOLUTION INTRAVENOUS CONTINUOUS
Status: DISCONTINUED | OUTPATIENT
Start: 2017-05-25 | End: 2017-05-25

## 2017-05-25 RX ORDER — FLUMAZENIL 0.1 MG/ML
0.2 INJECTION INTRAVENOUS
Status: DISCONTINUED | OUTPATIENT
Start: 2017-05-25 | End: 2017-05-25 | Stop reason: HOSPADM

## 2017-05-25 RX ORDER — FENTANYL CITRATE 50 UG/ML
50 INJECTION, SOLUTION INTRAMUSCULAR; INTRAVENOUS
Status: DISCONTINUED | OUTPATIENT
Start: 2017-05-25 | End: 2017-05-25 | Stop reason: HOSPADM

## 2017-05-25 RX ORDER — SODIUM CHLORIDE 0.9 % (FLUSH) 0.9 %
5-10 SYRINGE (ML) INJECTION AS NEEDED
Status: DISCONTINUED | OUTPATIENT
Start: 2017-05-25 | End: 2017-05-25 | Stop reason: HOSPADM

## 2017-05-25 RX ORDER — FENTANYL CITRATE 50 UG/ML
INJECTION, SOLUTION INTRAMUSCULAR; INTRAVENOUS AS NEEDED
Status: DISCONTINUED | OUTPATIENT
Start: 2017-05-25 | End: 2017-05-25 | Stop reason: HOSPADM

## 2017-05-25 RX ORDER — BISMUTH SUBSALICYLATE 262 MG
1 TABLET,CHEWABLE ORAL DAILY
COMMUNITY

## 2017-05-25 RX ORDER — MIDAZOLAM HYDROCHLORIDE 1 MG/ML
INJECTION, SOLUTION INTRAMUSCULAR; INTRAVENOUS AS NEEDED
Status: DISCONTINUED | OUTPATIENT
Start: 2017-05-25 | End: 2017-05-25 | Stop reason: HOSPADM

## 2017-05-25 RX ORDER — INSULIN LISPRO 100 [IU]/ML
INJECTION, SOLUTION INTRAVENOUS; SUBCUTANEOUS ONCE
Status: DISCONTINUED | OUTPATIENT
Start: 2017-05-25 | End: 2017-05-25 | Stop reason: HOSPADM

## 2017-05-25 RX ORDER — BUPIVACAINE HYDROCHLORIDE 2.5 MG/ML
INJECTION, SOLUTION EPIDURAL; INFILTRATION; INTRACAUDAL AS NEEDED
Status: DISCONTINUED | OUTPATIENT
Start: 2017-05-25 | End: 2017-05-25 | Stop reason: HOSPADM

## 2017-05-25 RX ORDER — PROPOFOL 10 MG/ML
INJECTION, EMULSION INTRAVENOUS AS NEEDED
Status: DISCONTINUED | OUTPATIENT
Start: 2017-05-25 | End: 2017-05-25 | Stop reason: HOSPADM

## 2017-05-25 RX ORDER — NALOXONE HYDROCHLORIDE 0.4 MG/ML
0.1 INJECTION, SOLUTION INTRAMUSCULAR; INTRAVENOUS; SUBCUTANEOUS AS NEEDED
Status: DISCONTINUED | OUTPATIENT
Start: 2017-05-25 | End: 2017-05-25 | Stop reason: HOSPADM

## 2017-05-25 RX ORDER — MAGNESIUM SULFATE 100 %
4 CRYSTALS MISCELLANEOUS AS NEEDED
Status: DISCONTINUED | OUTPATIENT
Start: 2017-05-25 | End: 2017-05-25 | Stop reason: HOSPADM

## 2017-05-25 RX ORDER — SODIUM CHLORIDE, SODIUM LACTATE, POTASSIUM CHLORIDE, CALCIUM CHLORIDE 600; 310; 30; 20 MG/100ML; MG/100ML; MG/100ML; MG/100ML
1000 INJECTION, SOLUTION INTRAVENOUS CONTINUOUS
Status: DISCONTINUED | OUTPATIENT
Start: 2017-05-25 | End: 2017-05-25 | Stop reason: HOSPADM

## 2017-05-25 RX ORDER — LIDOCAINE HYDROCHLORIDE 20 MG/ML
INJECTION, SOLUTION EPIDURAL; INFILTRATION; INTRACAUDAL; PERINEURAL AS NEEDED
Status: DISCONTINUED | OUTPATIENT
Start: 2017-05-25 | End: 2017-05-25 | Stop reason: HOSPADM

## 2017-05-25 RX ADMIN — FENTANYL CITRATE 25 MCG: 50 INJECTION, SOLUTION INTRAMUSCULAR; INTRAVENOUS at 12:41

## 2017-05-25 RX ADMIN — LIDOCAINE HYDROCHLORIDE 60 MG: 20 INJECTION, SOLUTION EPIDURAL; INFILTRATION; INTRACAUDAL; PERINEURAL at 12:41

## 2017-05-25 RX ADMIN — SODIUM CHLORIDE, SODIUM LACTATE, POTASSIUM CHLORIDE, AND CALCIUM CHLORIDE 125 ML/HR: 600; 310; 30; 20 INJECTION, SOLUTION INTRAVENOUS at 11:02

## 2017-05-25 RX ADMIN — PROPOFOL 50 MG: 10 INJECTION, EMULSION INTRAVENOUS at 12:50

## 2017-05-25 RX ADMIN — ONDANSETRON 4 MG: 2 INJECTION INTRAMUSCULAR; INTRAVENOUS at 12:48

## 2017-05-25 RX ADMIN — FENTANYL CITRATE 25 MCG: 50 INJECTION, SOLUTION INTRAMUSCULAR; INTRAVENOUS at 12:35

## 2017-05-25 RX ADMIN — FENTANYL CITRATE 25 MCG: 50 INJECTION, SOLUTION INTRAMUSCULAR; INTRAVENOUS at 12:51

## 2017-05-25 RX ADMIN — FENTANYL CITRATE 25 MCG: 50 INJECTION, SOLUTION INTRAMUSCULAR; INTRAVENOUS at 12:48

## 2017-05-25 RX ADMIN — MIDAZOLAM HYDROCHLORIDE 1 MG: 1 INJECTION, SOLUTION INTRAMUSCULAR; INTRAVENOUS at 12:38

## 2017-05-25 RX ADMIN — PROPOFOL 150 MG: 10 INJECTION, EMULSION INTRAVENOUS at 12:41

## 2017-05-25 RX ADMIN — MIDAZOLAM HYDROCHLORIDE 1 MG: 1 INJECTION, SOLUTION INTRAMUSCULAR; INTRAVENOUS at 12:35

## 2017-05-25 NOTE — PERIOP NOTES
Dr Sharad Cobos notified of patient having Eliquis last on 5/20/17. No orders given. Okay to proceed.

## 2017-05-25 NOTE — PERIOP NOTES
family updated via phone number in front of chart, she is leaving for half an hour and will come back and wait in downstairs waiting room

## 2017-05-25 NOTE — PERIOP NOTES
TRANSFER - OUT REPORT:    Verbal report given to Selma Theodore RN (name) on [address]  being transferred to phase 2(unit) for routine post - op       Report consisted of patients Situation, Background, Assessment and   Recommendations(SBAR). Information from the following report(s) SBAR, Intake/Output and MAR was reviewed with the receiving nurse. Lines:   Peripheral IV 05/25/17 Right Hand (Active)   Site Assessment Clean, dry, & intact 5/25/2017  1:42 PM   Phlebitis Assessment 0 5/25/2017  1:42 PM   Infiltration Assessment 0 5/25/2017  1:42 PM   Dressing Status Clean, dry, & intact 5/25/2017  1:42 PM   Dressing Type Transparent;Tape 5/25/2017  1:42 PM   Hub Color/Line Status Pink; Infusing 5/25/2017  1:42 PM   Alcohol Cap Used No 5/25/2017 10:57 AM        Opportunity for questions and clarification was provided.       Patient transported with:   [employer]

## 2017-05-25 NOTE — IP AVS SNAPSHOT
[address] [address] Patient: Marly Hickey MRN: [id number] You are allergic to the following Allergen Reactions Feldene (Piroxicam) Photosensitivity Monosodium Glutamate Nausea and Vomiting Pt states he went into a-fib too Prednisone Palpitations Elevated blood sugar very high and went into a-fib Mercurochrome (Merbromin) Rash Adhesive Rash Cymbalta (Duloxetine) Unknown (comments) Oxycodone Other (comments) Decreased apetite, insomnia, poor concentration, lasting 3 weeks after the last dose Recent Documentation Height Weight BMI Smoking Status 1.778 m 109.4 kg 34.61 kg/m2 Never Smoker Emergency Contacts Name Discharge Info Relation Home Work Mobile [address] CAREGIVER [3] Spouse [3] [phone] About your hospitalization You were admitted on:  May 25, 2017 You last received care in the:  [facility] PHASE 2 RECOVERY You were discharged on:  May 25, 2017 Unit phone number:    
  
Why you were hospitalized Your primary diagnosis was:  Carpal Tunnel Syndrome Of Left Wrist  
 Your diagnoses also included:  Acquired Hypothyroidism, A-Fib (Hcc), Bph (Benign Prostatic Hyperplasia), Diabetes Mellitus Type 2, Controlled (Hcc), History Of Gastric Ulcer, Htn (Hypertension), Multiple Myeloma (Hcc), Peripheral Neuropathy (Hcc), Pernicious Anemia Providers Seen During Your Hospitalizations Provider Role Specialty Primary office phone Jimmie Escobar MD Attending Provider Orthopedic Surgery [phone] Your Primary Care Physician (PCP) Primary Care Physician Office Phone Office Fax [address], [address] [phone] Follow-up Information Follow up With Details Comments Contact Info Nathan Fabian, [address] 
 [address] 
[phone] Current Discharge Medication List  
  
CONTINUE these medications which have NOT CHANGED Dose & Instructions Dispensing Information Comments Morning Noon Evening Bedtime BIOTIN PO Your last dose was: Your next dose is:    
   
   
 Dose:  1000 mg Take 1,000 mg by mouth daily. Refills:  0 CeleBREX 200 mg capsule Generic drug:  celecoxib Your last dose was: Your next dose is:    
   
   
 Dose:  200 mg Take 200 mg by mouth daily. Refills:  0  
     
   
   
   
  
 citalopram 10 mg tablet Commonly known as:  Charyl Elliot Your last dose was: Your next dose is:    
   
   
 Dose:  10 mg Take 10 mg by mouth daily (after dinner). Refills:  0  
     
   
   
   
  
 COQ10  100-100 mg-unit Cap Generic drug:  coenzyme q10-vitamin e Your last dose was: Your next dose is:    
   
   
 Dose:  300 mg Take 300 mg by mouth daily. Refills:  0  
     
   
   
   
  
 ELIQUIS 5 mg tablet Generic drug:  apixaban Your last dose was: Your next dose is:    
   
   
 Dose:  5 mg Take 5 mg by mouth two (2) times a day. Refills:  0  
     
   
   
   
  
 FISH OIL PO Your last dose was: Your next dose is:    
   
   
 Dose:  1200 mg Take 1,200 mg by mouth every Monday, Wednesday, Friday. Refills:  0  
     
   
   
   
  
 glimepiride 1 mg tablet Commonly known as:  AMARYL Your last dose was: Your next dose is:    
   
   
 Dose:  0.5 mg Take 0.5 mg by mouth Daily (before breakfast). Refills:  0  
     
   
   
   
  
 glucosamine-chondroitin 750-600 mg Tab Your last dose was: Your next dose is:    
   
   
 Dose:  2 Tab Take 2 Tabs by mouth daily. Refills:  0 HYDROcodone-acetaminophen 5-325 mg per tablet Commonly known as:  Dary Tejada Your last dose was: Your next dose is:    
   
   
 Dose:  1-2 Tab Take 1-2 Tabs by mouth every four (4) hours as needed for Pain. Max Daily Amount: 12 Tabs. Quantity:  40 Tab Refills:  0 Iron 325 mg (65 mg iron) tablet Generic drug:  ferrous sulfate Your last dose was: Your next dose is:    
   
   
 Dose:  65 mg Take 65 mg by mouth every Monday, Wednesday, Friday. Refills:  0  
     
   
   
   
  
 levothyroxine 100 mcg tablet Commonly known as:  SYNTHROID Your last dose was: Your next dose is:    
   
   
 Dose:  100 mcg Take 100 mcg by mouth Daily (before breakfast). Refills:  0 LORazepam 0.5 mg tablet Commonly known as:  ATIVAN Your last dose was: Your next dose is:    
   
   
 Dose:  0.5 mg Take 0.5 mg by mouth daily as needed for Anxiety. Refills:  0  
     
   
   
   
  
 losartan 50 mg tablet Commonly known as:  COZAAR Your last dose was: Your next dose is:    
   
   
 Dose:  50 mg Take 50 mg by mouth daily. Refills:  0  
     
   
   
   
  
 metFORMIN 500 mg tablet Commonly known as:  GLUCOPHAGE Your last dose was: Your next dose is:    
   
   
 Dose:  1000 mg Take 1,000 mg by mouth two (2) times daily (with meals). Indications: TYPE 2 DIABETES MELLITUS Refills:  0  
     
   
   
   
  
 multivitamin tablet Commonly known as:  ONE A DAY Your last dose was: Your next dose is:    
   
   
 Dose:  1 Tab Take 1 Tab by mouth daily. Refills:  0  
     
   
   
   
  
 omeprazole 20 mg capsule Commonly known as:  PRILOSEC Your last dose was: Your next dose is:    
   
   
 Dose:  20 mg Take 20 mg by mouth daily. Indications: GASTROESOPHAGEAL REFLUX Refills:  0  
     
   
   
   
  
 pravastatin 40 mg tablet Commonly known as:  PRAVACHOL Your last dose was:     
   
Your next dose is:    
   
   
 Dose:  40 mg  
 Take 40 mg by mouth nightly. Refills:  0 PROBIOTIC 10 billion cell capsule Generic drug:  lactobacillus rhamnosus gg 10 billion cell Your last dose was: Your next dose is:    
   
   
 Dose:  1 Cap Take 1 Cap by mouth every Monday, Wednesday, Friday. Refills:  0  
     
   
   
   
  
 TESTOSTERONE CYPIONATE IM Your last dose was: Your next dose is:    
   
   
 Dose:  1.5 mL  
1.5 mL by IntraMUSCular route Once every 2 weeks. Refills:  0  
     
   
   
   
  
 timolol 0.5 % ophthalmic solution Commonly known as:  TIMOPTIC Your last dose was: Your next dose is:    
   
   
 Dose:  1 Drop Administer 1 Drop to right eye daily. Refills:  0  
     
   
   
   
  
 TRAVATAN Z OP Your last dose was: Your next dose is:    
   
   
 Dose:  1 Drop Apply 1 Drop to eye nightly. Both eyes Refills:  0  
     
   
   
   
  
 VITAMIN B-12 1,000 mcg tablet Generic drug:  cyanocobalamin Your last dose was: Your next dose is:    
   
   
 Dose:  2500 mcg  
2,500 mcg by SubLINGual route daily. Refills:  0  
     
   
   
   
  
 VITAMIN D2 50,000 unit capsule Generic drug:  ergocalciferol Your last dose was: Your next dose is:    
   
   
 Dose:  65824 Units Take 50,000 Units by mouth Every Saturday. Refills:  0 STOP taking these medications   
 acetaminophen 500 mg tablet Commonly known as:  TYLENOL Discharge Instructions Wallace Cooper III, MD Chad Alvine, PA-C Upper Extremity Surgery Discharge Instructions Please take the time to review the following instructions before you leave the hospital and use them as guidelines during your recovery from surgery. If you have any questions you may contact my office at [phone]. Wound Care/Dressing Changes: You may remove the bulky dressing two days after surgery. Once you remove this, no dressing is necessary if there is no drainage. You may change your dressing as needed. Beginning the 2 days after you are discharged from the hospital you should change your dressing daily. A big, bulky dressing isnt necessary as long as there is any drainage from the incisions. You can put a band-aid or a piece of gauze over each incision and wear an ACE bandage as needed for comfort and swelling. Dont remove your dressing or get them wet. ? It isnt necessary to apply antibiotic ointment to your incisions. Sutures will be removed at your one week post-op visit. Staples (if you have them) are removed in two weeks. If you have steri-strips over your incision they will start to peel off in 7-10 days as you get them wet. They dont need to be removed prior to that. When they begin to peel off, you may remove them. They should all be removed by 14 days from your surgery. Showering/Bathing: You may shower 2 days after your surgery. Your dressing may be removed for showering. You may get your incisions wet in the shower. Dont vigorously scrub your incisions. Apply a clean, dry dressing after you have dried your incisions. Do not take a bath or get into a swimming pool or Jacuzzi until the incisions are completely healed. This may take about 14 days. Do not soak your incision under water. Sling: You are not required to wear your sling and should do so only as needed for comfort. You have no restrictions with regards to the movement of your shoulder. Please push to achieve full range of motion as soon as possible. You may resume your normal daily activities immediately and return to work as soon as you feel appropriate. Keep your arm in the immobilizer at all times except when showering and changing your clothes.   When showering or changing, keep your arm at your side.  Dont move it away from your body. Keep your arm in the immobilizer at all times except when showering, changing your clothes and doing the exercises shown to you by Dr. Glendy Benitez or your physical therapist prior to your discharge from the hospital.  Keep your arm at your side when changing your clothes and showering. Dont move it away from your body. Ice/Elevation Continue ice consistently for 48 hours after surgery. After 48 hours, you should ice your shoulder 3 times per day, for 20 minutes at a time for the next 5 days. After one week from surgery, you may use ice as needed for pain and swelling. Diet: 
 
You may advance to your regular diet as tolerated. Medication: 
 
1. You will be given a prescription for pain medication when you are discharged from the hospital.  Take the medication as needed according to the directions on the prescription bottle. Possible side effects of the medication include dizziness, headache, nausea, vomiting, constipation and urinary retention. If you experience any of these side effects call the office so that we can assist you in relieving them. Discontinue the use of the pain medication if you develop itching, rash, shortness of breath or difficulties swallowing. If these symptoms become severe or arent relieved by discontinuing the medication you should seek immediate medical attention. Refills of pain medication are authorized during office hours only (8 AM-5PM Mon. thru Fri.). 2. If you were prescribed Percoset/oxycodone or Dilaudid/hydromorphone you must have a written prescription. These medications legally cannot be called in to the pharmacy. 3. You may take over the counter Ibuprofen/Advil/Aleve between dosages of your pain medication if needed. Do not take Tylenol in addition to your pain medication as most of the pain medication already contains Tylenol. Do not exceed 3000mg of Tylenol per day.   Ex: (hydrocodone 5/325g= 325mg of Tylenol) 4. You may resume the medication you were taking prior to surgery. Pain medication may change the effects of any antidepressant medication you are taking. If you have any questions about possible interactions between your regular medications and the pain medication you should consult the physician who prescribes your regular medications. Follow Up Appointment: If you are unsure of your follow-up appointment date and time, please call [phone]. Please let our  know you are scheduling a post-op appointment. Most appointments should be between 7-14 days after your surgery. Physical Therapy: If you already have a therapy appointment, please be sure to attend your sessions as scheduled. Physical Therapy will be discussed with you at your first follow-up appointment with Dr. Oskar Rangel. You dont need to begin physical therapy prior to that. Begin physical therapy with your Home Health Physical Therapy. This will be set up         for your before you leave the hospital. 
 
  You do not require Physical Therapy. Important Signs and Symptoms: 
 
If any of the following signs and symptoms occurs, you should contact Dr. Oskar Rangel office. Please be advised if a problem arises which you feel requires immediate medical attention or you are unable to contact Dr. Oskar Rangel office you should seek immediate medical attention. Signs and symptoms to watch for include: 1. A sudden increase in swelling and/or redness or warmth at the area your surgery was performed which isnt relieved by rest, ice, and elevation. 2. Oral temperature greater than 101 degrees for 12 hours or more which isnt relieved by an increase in fluid intake and taking two Tylenol every 4-6 hours. 3. Excessive drainage from your incisions, or drainage which hasnt stopped by 72 hours after your surgery.  
4. Fever, chills, shortness of breath, chest pain, nausea, vomiting or other signs and symptoms which are of concern to you. DISCHARGE SUMMARY from Nurse The following personal items are in your possession at time of discharge: 
 
Dental Appliances: None Home Medications: None Jewelry: None Clothing: Undergarments, Footwear, Socks, Pants, Shirt (in locker #18) Other Valuables: None PATIENT INSTRUCTIONS: 
 
 
F-face looks uneven A-arms unable to move or move unevenly S-speech slurred or non-existent T-time-call 911 as soon as signs and symptoms begin-DO NOT go Back to bed or wait to see if you get better-TIME IS BRAIN. Warning Signs of HEART ATTACK Call 911 if you have these symptoms: 
? Chest discomfort. Most heart attacks involve discomfort in the center of the chest that lasts more than a few minutes, or that goes away and comes back. It can feel like uncomfortable pressure, squeezing, fullness, or pain. ? Discomfort in other areas of the upper body. Symptoms can include pain or discomfort in one or both arms, the back, neck, jaw, or stomach. ? Shortness of breath with or without chest discomfort. ? Other signs may include breaking out in a cold sweat, nausea, or lightheadedness. Don't wait more than five minutes to call [address]! Fast action can save your life. Calling 911 is almost always the fastest way to get lifesaving treatment. Emergency Medical Services staff can begin treatment when they arrive  up to an hour sooner than if someone gets to the hospital by car.   
 
 
The discharge information has been reviewed with the patient and caregiver. The patient and caregiver verbalized understanding. Discharge medications reviewed with the patient and caregiver and appropriate educational materials and side effects teaching were provided. Patient armband removed and shredded Discharge Orders None Introducing [facility]! Abner Varela introduces [employer] patient portal. Now you can access parts of your medical record, email your doctor's office, and request medication refills online. 1. In your internet browser, go to [email or web address] 2. Click on the First Time User? Click Here link in the Sign In box. You will see the New Member Sign Up page. 3. Enter your [employer] Access Code exactly as it appears below. You will not need to use this code after youve completed the sign-up process. If you do not sign up before the expiration date, you must request a new code. · [employer] Access Code: [email or web address] Expires: 7/17/2017  6:42 PM 
 
4. Enter the last four digits of your Social Security Number (xxxx) and Date of Birth (mm/dd/yyyy) as indicated and click Submit. You will be taken to the next sign-up page. 5. Create a [employer] ID. This will be your [employer] login ID and cannot be changed, so think of one that is secure and easy to remember. 6. Create a [employer] password. You can change your password at any time. 7. Enter your Password Reset Question and Answer. This can be used at a later time if you forget your password. 8. Enter your e-mail address. You will receive e-mail notification when new information is available in [address] 9. Click Sign Up. You can now view and download portions of your medical record. 10. Click the Download Summary menu link to download a portable copy of your medical information. If you have questions, please visit the Frequently Asked Questions section of the [employer] website.  Remember, [employer] is NOT to be used for urgent needs. For medical emergencies, dial 911. Now available from your iPhone and Android! General Information Please provide this summary of care documentation to your next provider. Patient Signature:  ____________________________________________________________ Date:  ____________________________________________________________  
  
Laanda Mia Provider Signature:  ____________________________________________________________ Date:  ____________________________________________________________

## 2017-05-25 NOTE — ANESTHESIA POSTPROCEDURE EVALUATION
Post-Anesthesia Evaluation & Assessment    Visit Vitals    /68    Pulse 79    Temp 37 °C (98.6 °F)    Resp 19    Ht 5' 10\" (1.778 m)    Wt 109.4 kg (241 lb 3 oz)    SpO2 97%    BMI 34.61 kg/m2       No untreated/active PONV    Post-operative hydration adequate. Adequate post-operative analgesia per PACU discharge criteria    Mental status & level of consciousness: alert and oriented x 3    Respiratory status: patent unassisted airway     No apparent anesthetic complications requiring additional post-anesthetic care    Patient has met all discharge requirements.             Daisy Harper MD

## 2017-05-25 NOTE — ANESTHESIA PREPROCEDURE EVALUATION
Anesthetic History   No history of anesthetic complications            Review of Systems / Medical History  Patient summary reviewed, nursing notes reviewed and pertinent labs reviewed    Pulmonary  Within defined limits                 Neuro/Psych       CVA  TIA and psychiatric history     Cardiovascular    Hypertension: well controlled              Exercise tolerance: >4 METS     GI/Hepatic/Renal     GERD: well controlled      PUD     Endo/Other    Diabetes: well controlled  Hypothyroidism: well controlled  Arthritis     Other Findings            Physical Exam    Airway  Mallampati: II  TM Distance: 4 - 6 cm  Neck ROM: normal range of motion   Mouth opening: Normal     Cardiovascular    Rhythm: irregular  Rate: normal         Dental  No notable dental hx       Pulmonary  Breath sounds clear to auscultation               Abdominal  GI exam deferred       Other Findings            Anesthetic Plan    ASA: 3  Anesthesia type: general          Induction: Intravenous  Anesthetic plan and risks discussed with: Patient

## 2017-05-25 NOTE — DISCHARGE INSTRUCTIONS
Lo Mcgill III, MD Mccoy Feller, PA-C    Upper Extremity Surgery  Discharge Instructions      Please take the time to review the following instructions before you leave the hospital and use them as guidelines during your recovery from surgery. If you have any questions you may contact my office at [phone]. Wound Care/Dressing Changes:    []   You may remove the bulky dressing two days after surgery. Once you remove this, no dressing is necessary if there is no drainage. [x]   You may change your dressing as needed. Beginning the 2 days after you are discharged from the hospital you should change your dressing daily. A big, bulky dressing isnt necessary as long as there is any drainage from the incisions. You can put a band-aid or a piece of gauze over each incision and wear an ACE bandage as needed for comfort and swelling. []   Dont remove your dressing or get them wet.  It isnt necessary to apply antibiotic ointment to your incisions. Sutures will be removed at your one week post-op visit. Staples (if you have them) are removed in two weeks. If you have steri-strips over your incision they will start to peel off in 7-10 days as you get them wet. They dont need to be removed prior to that. When they begin to peel off, you may remove them. They should all be removed by 14 days from your surgery. Showering/Bathing:    [x]   You may shower 2 days after your surgery. Your dressing may be removed for showering. You may get your incisions wet in the shower. Dont vigorously scrub your incisions. Apply a clean, dry dressing after you have dried your incisions. Do not take a bath or get into a swimming pool or Jacuzzi until the incisions are completely healed. This may take about 14 days. Do not soak your incision under water. Sling:    []   You are not required to wear your sling and should do so only as needed for comfort.  You have no restrictions with regards to the movement of your shoulder. Please push to achieve full range of motion as soon as possible. You may resume your normal daily activities immediately and return to work as soon as you feel appropriate.    []   Keep your arm in the immobilizer at all times except when showering and changing your clothes. When showering or changing, keep your arm at your side. Dont move it away from your body. []   Keep your arm in the immobilizer at all times except when showering, changing your clothes and doing the exercises shown to you by Dr. Glendy Benitez or your physical therapist prior to your discharge from the hospital.  Keep your arm at your side when changing your clothes and showering. Dont move it away from your body. Ice/Elevation    Continue ice consistently for 48 hours after surgery. After 48 hours, you should ice your shoulder 3 times per day, for 20 minutes at a time for the next 5 days. After one week from surgery, you may use ice as needed for pain and swelling. Diet:    You may advance to your regular diet as tolerated. Medication:    1. You will be given a prescription for pain medication when you are discharged from the hospital.  Take the medication as needed according to the directions on the prescription bottle. Possible side effects of the medication include dizziness, headache, nausea, vomiting, constipation and urinary retention. If you experience any of these side effects call the office so that we can assist you in relieving them. Discontinue the use of the pain medication if you develop itching, rash, shortness of breath or difficulties swallowing. If these symptoms become severe or arent relieved by discontinuing the medication you should seek immediate medical attention. Refills of pain medication are authorized during office hours only (8 AM-5PM Mon. thru Fri.).    2. If you were prescribed Percoset/oxycodone or Dilaudid/hydromorphone you must have a written prescription. These medications legally cannot be called in to the pharmacy. 3. You may take over the counter Ibuprofen/Advil/Aleve between dosages of your pain medication if needed. Do not take Tylenol in addition to your pain medication as most of the pain medication already contains Tylenol. Do not exceed 3000mg of Tylenol per day. Ex: (hydrocodone 5/325g= 325mg of Tylenol)  4. You may resume the medication you were taking prior to surgery. Pain medication may change the effects of any antidepressant medication you are taking. If you have any questions about possible interactions between your regular medications and the pain medication you should consult the physician who prescribes your regular medications. Follow Up Appointment:  If you are unsure of your follow-up appointment date and time, please call [phone]. Please let our  know you are scheduling a post-op appointment. Most appointments should be between 7-14 days after your surgery. Physical Therapy:    []    If you already have a therapy appointment, please be sure to attend your sessions as scheduled. []   Physical Therapy will be discussed with you at your first follow-up appointment with Dr. Divya Palencia. You dont need to begin physical therapy prior to that.    []  Begin physical therapy with your Home Health Physical Therapy. This will be set up         for your before you leave the hospital.    [x]  You do not require Physical Therapy. Important Signs and Symptoms:    If any of the following signs and symptoms occurs, you should contact Dr. Divya Palencia office. Please be advised if a problem arises which you feel requires immediate medical attention or you are unable to contact Dr. Divya Palencia office you should seek immediate medical attention. Signs and symptoms to watch for include:    1.  A sudden increase in swelling and/or redness or warmth at the area your surgery was performed which isnt relieved by rest, ice, and elevation. 2. Oral temperature greater than 101 degrees for 12 hours or more which isnt relieved by an increase in fluid intake and taking two Tylenol every 4-6 hours. 3. Excessive drainage from your incisions, or drainage which hasnt stopped by 72 hours after your surgery. 4. Fever, chills, shortness of breath, chest pain, nausea, vomiting or other signs and symptoms which are of concern to you. DISCHARGE SUMMARY from Nurse    The following personal items are in your possession at time of discharge:    Dental Appliances: None        Home Medications: None  Jewelry: None  Clothing: Undergarments, Footwear, Socks, Pants, Shirt (in locker #18)  Other Valuables: None             PATIENT INSTRUCTIONS:    After general anesthesia or intravenous sedation, for 24 hours or while taking prescription Narcotics:  · Limit your activities  · Do not drive and operate hazardous machinery  · Do not make important personal or business decisions  · Do  not drink alcoholic beverages  · If you have not urinated within 8 hours after discharge, please contact your surgeon on call. Report the following to your surgeon:  · Excessive pain, swelling, redness or odor of or around the surgical area  · Temperature over 100.5  · Nausea and vomiting lasting longer than 4 hours or if unable to take medications  · Any signs of decreased circulation or nerve impairment to extremity: change in color, persistent  numbness, tingling, coldness or increase pain  · Any questions        What to do at Home:  Recommended activity: Activity as tolerated and no driving for today, Ambulate in house, No lifting, Driving, or Strenuous exercise until advised and No driving while on analgesics. If you experience any of the following symptoms as above, please follow up with Dr. Nora Quintana. *  Please give a list of your current medications to your Primary Care Provider.     *  Please update this list whenever your medications are discontinued, doses are      changed, or new medications (including over-the-counter products) are added. *  Please carry medication information at all times in case of emergency situations. These are general instructions for a healthy lifestyle:    No smoking/ No tobacco products/ Avoid exposure to second hand smoke    Surgeon General's Warning:  Quitting smoking now greatly reduces serious risk to your health. Obesity, smoking, and sedentary lifestyle greatly increases your risk for illness    A healthy diet, regular physical exercise & weight monitoring are important for maintaining a healthy lifestyle    You may be retaining fluid if you have a history of heart failure or if you experience any of the following symptoms:  Weight gain of 3 pounds or more overnight or 5 pounds in a week, increased swelling in our hands or feet or shortness of breath while lying flat in bed. Please call your doctor as soon as you notice any of these symptoms; do not wait until your next office visit. Recognize signs and symptoms of STROKE:    F-face looks uneven    A-arms unable to move or move unevenly    S-speech slurred or non-existent    T-time-call 911 as soon as signs and symptoms begin-DO NOT go       Back to bed or wait to see if you get better-TIME IS BRAIN. Warning Signs of HEART ATTACK     Call 911 if you have these symptoms:   Chest discomfort. Most heart attacks involve discomfort in the center of the chest that lasts more than a few minutes, or that goes away and comes back. It can feel like uncomfortable pressure, squeezing, fullness, or pain.  Discomfort in other areas of the upper body. Symptoms can include pain or discomfort in one or both arms, the back, neck, jaw, or stomach.  Shortness of breath with or without chest discomfort.  Other signs may include breaking out in a cold sweat, nausea, or lightheadedness. Don't wait more than five minutes to call 911 - MINUTES MATTER!  Fast action can save your life. Calling 911 is almost always the fastest way to get lifesaving treatment. Emergency Medical Services staff can begin treatment when they arrive -- up to an hour sooner than if someone gets to the hospital by car. The discharge information has been reviewed with the patient and caregiver. The patient and caregiver verbalized understanding. Discharge medications reviewed with the patient and caregiver and appropriate educational materials and side effects teaching were provided.     Patient armband removed and shredded

## 2017-05-25 NOTE — OP NOTES
Endoscopic Carpal Tunnel Release    Patient: Fernando Marquez MRN: [id number]  CSN: [id number]   YOB: 1935  Age: [de-identified] y.o. Sex: male      Date of Surgery:5/25/2017    PREOPERATIVE DIAGNOSES:  CARPAL TUNNEL      POSTOPERATIVE DIAGNOSES:  CARPAL TUNNEL    PROCEDURE: left Endoscpoic Carpal Tunnel Release    SURGEON: Crys Pham MD    SPECIMEN TO PATHOLOGY:  * No specimens in log *    ESTIMATED BLOOD LOSS: none    FINDINGS:  Same     TOURNIQUET  TIME:   Approximately 5 minutes at 232 Hg     COMPLICATIONS:  None     ANESTHESIA:  General    INDICATIONS:  A [de-identified]y.o. year old male with known carpal tunnel syndrome documented by clinical exam and nerve conduction studies. The patient now presents for a ECTR. PROCEDURE: The patient was brought into the operating theater and after adequate prepping and draping of the surgical wrist and hand the limb was then exsanguinated with an Esmarch bandage and the tourniquet was inflated. .  A 1 cm transverse incision was placed at the volar flexion wrist crease centered over the palmaris longus. The incision was deepened to the antebrachial fascia which was released the length of the wound and proximally for an additional 1 cm. Using the L[employer]atec carpal tunnel release kit, the carpal tunnel was dilated in line with the fourth metacarpal to the distal extent of the transverse carpal ligament. The cannula was then inserted and kept on some proximal radial deviation with the wrist in slight extension, and it was passed all the way to the cardinal line of Cm. The scope was then inserted with the wrist in slight extension and the undersurface of the transverse carpal ligament was seen easily from proximal to the distal fatty/adipose tissue at the end of the transverse carpal ligament.   Using the [facility] on the [facility] set and using the endoscope for visualization, the transverse carpal ligament was transected in two passes with good release of the ligament outside of view of the cannula. The nerve was never in harms way. The cannula was then withdrawn and placed back in the carpal tunnel with none of the prerelease tension. The wound was then closed with Mastisol on either side and then Steri-Strips were used to re-oppose the skin without the use of stitches. The skin and cut ends of the transverse carpal ligament were anesthetized with 4 to 5 mL of 0.25% Marcaine without epinephrine. This was dressed with two 4 x 4s and an Ace wrap. The tourniquet was deflated and removed, and the patient taken to the recovery room awake and in stable condition. All instrument, sponge and needle counts were correct.     Jero Jimenes MD  5/25/2017

## 2017-05-25 NOTE — INTERVAL H&P NOTE
H&P Update:  Ej Ugarte was seen and examined. History and physical has been reviewed. The patient has been examined. There have been no significant clinical changes since the completion of the originally dated History and Physical.  Patient identified by surgeon; surgical site was confirmed by patient and surgeon.     Signed By: Rico Downing MD     May 25, 2017 11:37 AM

## 2017-06-30 RX ORDER — SILODOSIN 8 MG/1
8 CAPSULE ORAL
COMMUNITY
End: 2017-09-18

## 2017-07-05 ENCOUNTER — HOSPITAL ENCOUNTER (OUTPATIENT)
Age: 82
Setting detail: OUTPATIENT SURGERY
Discharge: HOME | End: 2017-07-05
Attending: INTERNAL MEDICINE | Admitting: INTERNAL MEDICINE
Payer: MEDICARE

## 2017-07-05 VITALS
HEART RATE: 78 BPM | DIASTOLIC BLOOD PRESSURE: 83 MMHG | SYSTOLIC BLOOD PRESSURE: 136 MMHG | BODY MASS INDEX: 34.5 KG/M2 | WEIGHT: 241 LBS | OXYGEN SATURATION: 93 % | HEIGHT: 70 IN | RESPIRATION RATE: 16 BRPM | TEMPERATURE: 95.3 F

## 2017-07-05 LAB — GLUCOSE BLD STRIP.AUTO-MCNC: 135 MG/DL (ref 70–110)

## 2017-07-05 PROCEDURE — 99152 MOD SED SAME PHYS/QHP 5/>YRS: CPT | Performed by: INTERNAL MEDICINE

## 2017-07-05 PROCEDURE — 76040000007: Performed by: INTERNAL MEDICINE

## 2017-07-05 PROCEDURE — 77030009426 HC FCPS BIOP ENDOSC BSC -B: Performed by: INTERNAL MEDICINE

## 2017-07-05 PROCEDURE — 77030011640 HC PAD GRND REM COVD -A: Performed by: INTERNAL MEDICINE

## 2017-07-05 PROCEDURE — 88342 IMHCHEM/IMCYTCHM 1ST ANTB: CPT | Performed by: INTERNAL MEDICINE

## 2017-07-05 PROCEDURE — 88305 TISSUE EXAM BY PATHOLOGIST: CPT | Performed by: INTERNAL MEDICINE

## 2017-07-05 PROCEDURE — 74011250636 HC RX REV CODE- 250/636

## 2017-07-05 PROCEDURE — 82962 GLUCOSE BLOOD TEST: CPT

## 2017-07-05 PROCEDURE — 74011000250 HC RX REV CODE- 250: Performed by: INTERNAL MEDICINE

## 2017-07-05 PROCEDURE — 99153 MOD SED SAME PHYS/QHP EA: CPT | Performed by: INTERNAL MEDICINE

## 2017-07-05 PROCEDURE — 74011250636 HC RX REV CODE- 250/636: Performed by: INTERNAL MEDICINE

## 2017-07-05 RX ORDER — SODIUM CHLORIDE 9 MG/ML
100 INJECTION, SOLUTION INTRAVENOUS CONTINUOUS
Status: CANCELLED | OUTPATIENT
Start: 2017-07-05 | End: 2017-07-05

## 2017-07-05 RX ORDER — DEXTROMETHORPHAN/PSEUDOEPHED 2.5-7.5/.8
1.2 DROPS ORAL
Status: CANCELLED | OUTPATIENT
Start: 2017-07-05

## 2017-07-05 RX ORDER — ATROPINE SULFATE 0.1 MG/ML
0.5 INJECTION INTRAVENOUS
Status: CANCELLED | OUTPATIENT
Start: 2017-07-05 | End: 2017-07-05

## 2017-07-05 RX ORDER — NALOXONE HYDROCHLORIDE 0.4 MG/ML
0.4 INJECTION, SOLUTION INTRAMUSCULAR; INTRAVENOUS; SUBCUTANEOUS
Status: DISCONTINUED | OUTPATIENT
Start: 2017-07-05 | End: 2017-07-05 | Stop reason: HOSPADM

## 2017-07-05 RX ORDER — SODIUM CHLORIDE 9 MG/ML
125 INJECTION, SOLUTION INTRAVENOUS CONTINUOUS
Status: DISCONTINUED | OUTPATIENT
Start: 2017-07-05 | End: 2017-07-05 | Stop reason: HOSPADM

## 2017-07-05 RX ORDER — MIDAZOLAM HYDROCHLORIDE 1 MG/ML
.5-5 INJECTION, SOLUTION INTRAMUSCULAR; INTRAVENOUS
Status: DISCONTINUED | OUTPATIENT
Start: 2017-07-05 | End: 2017-07-05 | Stop reason: HOSPADM

## 2017-07-05 RX ORDER — FLUMAZENIL 0.1 MG/ML
0.2 INJECTION INTRAVENOUS
Status: DISCONTINUED | OUTPATIENT
Start: 2017-07-05 | End: 2017-07-05 | Stop reason: HOSPADM

## 2017-07-05 RX ORDER — EPINEPHRINE 0.1 MG/ML
1 INJECTION INTRACARDIAC; INTRAVENOUS
Status: CANCELLED | OUTPATIENT
Start: 2017-07-05 | End: 2017-07-05

## 2017-07-05 RX ORDER — FENTANYL CITRATE 50 UG/ML
100 INJECTION, SOLUTION INTRAMUSCULAR; INTRAVENOUS
Status: DISCONTINUED | OUTPATIENT
Start: 2017-07-05 | End: 2017-07-05 | Stop reason: HOSPADM

## 2017-07-05 RX ADMIN — SODIUM CHLORIDE 125 ML/HR: 900 INJECTION, SOLUTION INTRAVENOUS at 08:38

## 2017-07-05 NOTE — DISCHARGE INSTRUCTIONS
Tatyana Alvarado  [id number]  1935    EGD DISCHARGE INSTRUCTIONS  Discomfort:  Sore throat- throat lozenges or warm salt water gargle  redness at IV site- apply warm compress to area; if redness or soreness persist- contact your physician  Gaseous discomfort- walking, belching will help relieve any discomfort  You may not operate a vehicle until the next day  You may not engage in an occupation involving machinery or appliances until the next day  You may not drink alcoholic beverages until the next day  Avoid making any critical decisions for at least 24 hour    DIET   You may not resume your regular diet. Antireflux diet. ACTIVITY  You may not resume your normal daily activities   Spend the remainder of the day resting -  avoid any strenuous activity. CALL M.D. ANY SIGN OF   Increasing pain, nausea, vomiting  Abdominal distension (swelling)  New increased bleeding (oral or rectal)  Fever (chills)  Pain in chest area  Bloody discharge from nose or mouth  Shortness of breath     You may not not take any Advil, Aspirin, Ibuprofen, Motrin, Aleve, or Goodys for 7 days, ONLY  Tylenol as needed for pain. Follow-up Instructions: Follow-up in the office as scheduled or make a follow-up appointment in 2 weeks. Danny Bhandari MD  July 5, 2017      Patient armband removed and shredded      DISCHARGE SUMMARY from Nurse    The following personal items are in your possession at time of discharge:    Dental Appliances: None  Visual Aid: Other (comment) (cataracts )                            PATIENT INSTRUCTIONS:    After general anesthesia or intravenous sedation, for 24 hours or while taking prescription Narcotics:  · Limit your activities  · Do not drive and operate hazardous machinery  · Do not make important personal or business decisions  · Do  not drink alcoholic beverages  · If you have not urinated within 8 hours after discharge, please contact your surgeon on call.     Report the following to your surgeon:  · Excessive pain, swelling, redness or odor of or around the surgical area  · Temperature over 100.5  · Nausea and vomiting lasting longer than 4 hours or if unable to take medications  · Any signs of decreased circulation or nerve impairment to extremity: change in color, persistent  numbness, tingling, coldness or increase pain  · Any questions        What to do at Home:  Recommended activity: Activity as tolerated and no driving for today,     If you experience any of the following symptoms as above, please follow up with Dr. Lee Berry. *  Please give a list of your current medications to your Primary Care Provider. *  Please update this list whenever your medications are discontinued, doses are      changed, or new medications (including over-the-counter products) are added. *  Please carry medication information at all times in case of emergency situations. These are general instructions for a healthy lifestyle:    No smoking/ No tobacco products/ Avoid exposure to second hand smoke    Surgeon General's Warning:  Quitting smoking now greatly reduces serious risk to your health. Obesity, smoking, and sedentary lifestyle greatly increases your risk for illness    A healthy diet, regular physical exercise & weight monitoring are important for maintaining a healthy lifestyle    You may be retaining fluid if you have a history of heart failure or if you experience any of the following symptoms:  Weight gain of 3 pounds or more overnight or 5 pounds in a week, increased swelling in our hands or feet or shortness of breath while lying flat in bed. Please call your doctor as soon as you notice any of these symptoms; do not wait until your next office visit.     Recognize signs and symptoms of STROKE:    F-face looks uneven    A-arms unable to move or move unevenly    S-speech slurred or non-existent    T-time-call 911 as soon as signs and symptoms begin-DO NOT go       Back to bed or wait to see if you get better-TIME IS BRAIN. Warning Signs of HEART ATTACK     Call 911 if you have these symptoms:   Chest discomfort. Most heart attacks involve discomfort in the center of the chest that lasts more than a few minutes, or that goes away and comes back. It can feel like uncomfortable pressure, squeezing, fullness, or pain.  Discomfort in other areas of the upper body. Symptoms can include pain or discomfort in one or both arms, the back, neck, jaw, or stomach.  Shortness of breath with or without chest discomfort.  Other signs may include breaking out in a cold sweat, nausea, or lightheadedness. Don't wait more than five minutes to call 911 - MINUTES MATTER! Fast action can save your life. Calling 911 is almost always the fastest way to get lifesaving treatment. Emergency Medical Services staff can begin treatment when they arrive -- up to an hour sooner than if someone gets to the hospital by car. The discharge information has been reviewed with the patient and spouse. The patient and spouse verbalized understanding. Discharge medications reviewed with the patient and spouse and appropriate educational materials and side effects teaching were provided.

## 2017-07-05 NOTE — IP AVS SNAPSHOT
Marlen Faith 
 
 
 [address] [facility] [address] 
[phone] Patient: Jodi Godfrey MRN: [id number] FRM:[birth year] You are allergic to the following Allergen Reactions Feldene (Piroxicam) Photosensitivity Monosodium Glutamate Nausea and Vomiting Pt states he went into a-fib too Prednisone Palpitations Elevated blood sugar very high and went into a-fib Mercurochrome (Merbromin) Rash Adhesive Rash Cymbalta (Duloxetine) Unknown (comments) Oxycodone Other (comments) Decreased apetite, insomnia, poor concentration, lasting 3 weeks after the last dose Recent Documentation Height Weight BMI Smoking Status 1.778 m 109.3 kg 34.58 kg/m2 Never Smoker Emergency Contacts Name Discharge Info Relation Home Work Mobile [address] CAREGIVER [3] Spouse [3] [phone] About your hospitalization You were admitted on:  July 5, 2017 You last received care in the:  [facility] ENDOSCOPY You were discharged on:  July 5, 2017 Unit phone number:  [phone] Why you were hospitalized Your primary diagnosis was:  Not on File Providers Seen During Your Hospitalizations Provider Role Specialty Primary office phone Macario Melgar MD Attending Provider Gastroenterology [phone] Your Primary Care Physician (PCP) Primary Care Physician Office Phone Office Fax [address], [address] [phone] Follow-up Information Follow up With Details Comments Contact Info Macario Melgar MD   [address] [place] [address] [id number] [place] [address] 
[phone] Current Discharge Medication List  
  
ASK your doctor about these medications Dose & Instructions Dispensing Information Comments Morning Noon Evening Bedtime BIOTIN PO Your last dose was: Your next dose is:    
   
   
 Dose:  1000 mg Take 1,000 mg by mouth daily. Refills:  0 CeleBREX 200 mg capsule Generic drug:  celecoxib Your last dose was: Your next dose is:    
   
   
 Dose:  200 mg Take 200 mg by mouth daily. Refills:  0  
     
   
   
   
  
 citalopram 10 mg tablet Commonly known as:  Garth Krill Your last dose was: Your next dose is:    
   
   
 Dose:  10 mg Take 10 mg by mouth daily (after dinner). Refills:  0  
     
   
   
   
  
 COQ10  100-100 mg-unit Cap Generic drug:  coenzyme q10-vitamin e Your last dose was: Your next dose is:    
   
   
 Dose:  300 mg Take 300 mg by mouth daily. Refills:  0  
     
   
   
   
  
 ELIQUIS 5 mg tablet Generic drug:  apixaban Your last dose was: Your next dose is:    
   
   
 Dose:  5 mg Take 5 mg by mouth two (2) times a day. Refills:  0  
     
   
   
   
  
 FISH OIL PO Your last dose was: Your next dose is:    
   
   
 Dose:  1200 mg Take 1,200 mg by mouth every Monday, Wednesday, Friday. Refills:  0  
     
   
   
   
  
 glimepiride 1 mg tablet Commonly known as:  AMARYL Your last dose was: Your next dose is:    
   
   
 Dose:  0.5 mg Take 0.5 mg by mouth Daily (before breakfast). Refills:  0  
     
   
   
   
  
 glucosamine-chondroitin 750-600 mg Tab Your last dose was: Your next dose is:    
   
   
 Dose:  2 Tab Take 2 Tabs by mouth daily. Refills:  0 HYDROcodone-acetaminophen 5-325 mg per tablet Commonly known as:  Cecilia Phelpspherd Your last dose was: Your next dose is:    
   
   
 Dose:  1-2 Tab Take 1-2 Tabs by mouth every four (4) hours as needed for Pain. Max Daily Amount: 12 Tabs. Quantity:  40 Tab Refills:  0 Iron 325 mg (65 mg iron) tablet Generic drug:  ferrous sulfate Your last dose was: Your next dose is:    
   
   
 Dose:  65 mg Take 65 mg by mouth every Monday, Wednesday, Friday. Refills:  0  
     
   
   
   
  
 levothyroxine 100 mcg tablet Commonly known as:  SYNTHROID Your last dose was: Your next dose is:    
   
   
 Dose:  100 mcg Take 100 mcg by mouth Daily (before breakfast). Refills:  0 LORazepam 0.5 mg tablet Commonly known as:  ATIVAN Your last dose was: Your next dose is:    
   
   
 Dose:  0.5 mg Take 0.5 mg by mouth daily as needed for Anxiety. Refills:  0  
     
   
   
   
  
 losartan 50 mg tablet Commonly known as:  COZAAR Your last dose was: Your next dose is:    
   
   
 Dose:  50 mg Take 50 mg by mouth daily. Refills:  0  
     
   
   
   
  
 metFORMIN 500 mg tablet Commonly known as:  GLUCOPHAGE Your last dose was: Your next dose is:    
   
   
 Dose:  1000 mg Take 1,000 mg by mouth two (2) times daily (with meals). Indications: TYPE 2 DIABETES MELLITUS Refills:  0  
     
   
   
   
  
 multivitamin tablet Commonly known as:  ONE A DAY Your last dose was: Your next dose is:    
   
   
 Dose:  1 Tab Take 1 Tab by mouth daily. Refills:  0  
     
   
   
   
  
 omeprazole 20 mg capsule Commonly known as:  PRILOSEC Your last dose was: Your next dose is:    
   
   
 Dose:  20 mg Take 20 mg by mouth daily. Indications: GASTROESOPHAGEAL REFLUX Refills:  0  
     
   
   
   
  
 pravastatin 40 mg tablet Commonly known as:  PRAVACHOL Your last dose was: Your next dose is:    
   
   
 Dose:  40 mg Take 40 mg by mouth nightly. Refills:  0 PROBIOTIC 10 billion cell capsule Generic drug:  lactobacillus rhamnosus gg 10 billion cell Your last dose was: Your next dose is:    
   
   
 Dose:  1 Cap Take 1 Cap by mouth every Monday, Wednesday, Friday. Refills:  0  
     
   
   
   
  
 RAPAFLO 8 mg capsule Generic drug:  silodosin Your last dose was: Your next dose is:    
   
   
 Dose:  8 mg Take 8 mg by mouth daily (with breakfast). Refills:  0  
     
   
   
   
  
 TESTOSTERONE CYPIONATE IM Your last dose was: Your next dose is:    
   
   
 Dose:  1.5 mL  
1.5 mL by IntraMUSCular route Once every 2 weeks. Refills:  0  
     
   
   
   
  
 timolol 0.5 % ophthalmic solution Commonly known as:  TIMOPTIC Your last dose was: Your next dose is:    
   
   
 Dose:  1 Drop Administer 1 Drop to right eye daily. Refills:  0  
     
   
   
   
  
 TRAVATAN Z OP Your last dose was: Your next dose is:    
   
   
 Dose:  1 Drop Apply 1 Drop to eye nightly. Both eyes Refills:  0  
     
   
   
   
  
 VITAMIN B-12 1,000 mcg tablet Generic drug:  cyanocobalamin Your last dose was: Your next dose is:    
   
   
 Dose:  2500 mcg  
2,500 mcg by SubLINGual route daily. Refills:  0  
     
   
   
   
  
 VITAMIN D2 50,000 unit capsule Generic drug:  ergocalciferol Your last dose was: Your next dose is:    
   
   
 Dose:  24292 Units Take 50,000 Units by mouth Every Saturday. Refills:  0 Discharge Instructions Jennifer Arriola [id number] 
1935 EGD DISCHARGE INSTRUCTIONS Discomfort: 
Sore throat- throat lozenges or warm salt water gargle 
redness at IV site- apply warm compress to area; if redness or soreness persist- contact your physician Gaseous discomfort- walking, belching will help relieve any discomfort You may not operate a vehicle until the next day You may not engage in an occupation involving machinery or appliances until the next day You may not drink alcoholic beverages until the next day Avoid making any critical decisions for at least 24 hour DIET You may not resume your regular diet. Antireflux diet. ACTIVITY You may not resume your normal daily activities Spend the remainder of the day resting -  avoid any strenuous activity. CALL M.D. ANY SIGN OF Increasing pain, nausea, vomiting Abdominal distension (swelling) New increased bleeding (oral or rectal) Fever (chills) Pain in chest area Bloody discharge from nose or mouth Shortness of breath You may not not take any Advil, Aspirin, Ibuprofen, Motrin, Aleve, or Goodys for 7 days, ONLY  Tylenol as needed for pain. Follow-up Instructions: Follow-up in the office as scheduled or make a follow-up appointment in 2 weeks. Danny Bhandari MD 
July 5, 2017 Patient armband removed and shredded DISCHARGE SUMMARY from Nurse The following personal items are in your possession at time of discharge: 
 
Dental Appliances: None Visual Aid: Other (comment) (cataracts ) PATIENT INSTRUCTIONS: 
 
After general anesthesia or intravenous sedation, for 24 hours or while taking prescription Narcotics: · Limit your activities · Do not drive and operate hazardous machinery · Do not make important personal or business decisions · Do  not drink alcoholic beverages · If you have not urinated within 8 hours after discharge, please contact your surgeon on call. Report the following to your surgeon: 
· Excessive pain, swelling, redness or odor of or around the surgical area · Temperature over 100.5 · Nausea and vomiting lasting longer than 4 hours or if unable to take medications · Any signs of decreased circulation or nerve impairment to extremity: change in color, persistent  numbness, tingling, coldness or increase pain · Any questions What to do at Home: 
Recommended activity: Activity as tolerated and no driving for today,  
 
 If you experience any of the following symptoms as above, please follow up with Dr. Lee Brery. *  Please give a list of your current medications to your Primary Care Provider. *  Please update this list whenever your medications are discontinued, doses are 
    changed, or new medications (including over-the-counter products) are added. *  Please carry medication information at all times in case of emergency situations. These are general instructions for a healthy lifestyle: No smoking/ No tobacco products/ Avoid exposure to second hand smoke Surgeon General's Warning:  Quitting smoking now greatly reduces serious risk to your health. Obesity, smoking, and sedentary lifestyle greatly increases your risk for illness A healthy diet, regular physical exercise & weight monitoring are important for maintaining a healthy lifestyle You may be retaining fluid if you have a history of heart failure or if you experience any of the following symptoms:  Weight gain of 3 pounds or more overnight or 5 pounds in a week, increased swelling in our hands or feet or shortness of breath while lying flat in bed. Please call your doctor as soon as you notice any of these symptoms; do not wait until your next office visit. Recognize signs and symptoms of STROKE: 
 
F-face looks uneven A-arms unable to move or move unevenly S-speech slurred or non-existent T-time-call 911 as soon as signs and symptoms begin-DO NOT go Back to bed or wait to see if you get better-TIME IS BRAIN. Warning Signs of HEART ATTACK Call 911 if you have these symptoms: 
? Chest discomfort. Most heart attacks involve discomfort in the center of the chest that lasts more than a few minutes, or that goes away and comes back. It can feel like uncomfortable pressure, squeezing, fullness, or pain. ? Discomfort in other areas of the upper body.  Symptoms can include pain or discomfort in one or both arms, the back, neck, jaw, or stomach. ? Shortness of breath with or without chest discomfort. ? Other signs may include breaking out in a cold sweat, nausea, or lightheadedness. Don't wait more than five minutes to call [address]! Fast action can save your life. Calling 911 is almost always the fastest way to get lifesaving treatment. Emergency Medical Services staff can begin treatment when they arrive  up to an hour sooner than if someone gets to the hospital by car. The discharge information has been reviewed with the patient and spouse. The patient and spouse verbalized understanding. Discharge medications reviewed with the patient and spouse and appropriate educational materials and side effects teaching were provided. Discharge Orders None Introducing [facility] SERVICES! [facility] introduces [employer] patient portal. Now you can access parts of your medical record, email your doctor's office, and request medication refills online. 1. In your internet browser, go to [email or web address] 2. Click on the First Time User? Click Here link in the Sign In box. You will see the New Member Sign Up page. 3. Enter your [employer] Access Code exactly as it appears below. You will not need to use this code after youve completed the sign-up process. If you do not sign up before the expiration date, you must request a new code. · [employer] Access Code: [email or web address] Expires: 7/17/2017  6:42 PM 
 
4. Enter the last four digits of your Social Security Number (xxxx) and Date of Birth (mm/dd/yyyy) as indicated and click Submit. You will be taken to the next sign-up page. 5. Create a Electronic [employer] ID. This will be your [employer] login ID and cannot be changed, so think of one that is secure and easy to remember. 6. Create a Electronic [employer] password. You can change your password at any time. 7. Enter your Password Reset Question and Answer. This can be used at a later time if you forget your password. 8. Enter your e-mail address. You will receive e-mail notification when new information is available in [address] 9. Click Sign Up. You can now view and download portions of your medical record. 10. Click the Download Summary menu link to download a portable copy of your medical information. If you have questions, please visit the Frequently Asked Questions section of the [email or web address] website. Remember, [email or web address] is NOT to be used for urgent needs. For medical emergencies, dial 911. Now available from your iPhone and Android! General Information Please provide this summary of care documentation to your next provider. Patient Signature:  ____________________________________________________________ Date:  ____________________________________________________________  
  
[employer] [facility] Provider Signature:  ____________________________________________________________ Date:  ____________________________________________________________

## 2017-07-05 NOTE — PROCEDURES
[facility]  Esophagogastroduodenoscopy Procedure Report  _______________________________________________________  Patient: Melisa Isaacs                                         Attending Physician: Tracy Hill MD    Patient ID: [id number]                                      Referring Physician: David Cavazos MD    Exam Date: July 5, 2017 _______________________________________________________      Introduction: A  [de-identified] y.o. male patient, presents for Esophagogastroduodenoscopy Procedure. Indication: chronic GERD x many years started taking Omeprazole 20 mg daily on regular basis in the last 5 years only. He has rare episode of dyspepsia and heartburns in relation to certain greasy food. History of gastric surgery in Nov 1967 for bleeding ulcer. : Tracy Hill MD    Sedation:    Versed 4 mg IV, fentanyl 75 mcg IV, topical Hurricaine spray. Glucagon 1 mg. Procedure Details:  After infomed consent was obtained for the procedure, with all risks and benefits of procedure explained the patient was taken to the endoscopy suite and placed in the left lateral decubitus position. Following sequential administration of sedation as per above, the endoscope was inserted into the mouth and advanced under direct vision to efferent loop. A careful inspection was made as the gastroscope was withdrawn, including a retroflexed view of the proximal stomach; findings and interventions are described below. Findings:   HYPOPHARYNX AND LARYNX: Normal  Esophagus: Normal proximal and middle esophagus. Mild diffuse mucosal edema in the distal esophagus without evidence of erosive esophagitis or Khan's. The Z line is well respected and horizontal. no Hiatal hernia. Diaphragmatic pinch located at 45 cm. Stomach: s/p Billroth 2 surgery. Very small amount food or liquid retention. Diffuse atrophic and biliary gastritis biopsies taken otherwise normal, cardia, fundus, body.  Mild gastrojejunal anastomosis stenosis located at 60 cm, permitting the passage of the scope. Duodenum/jejunum: Efferent jejunum is normal and the afferent loop is intubated to the endo of the closed loop. Few inflammatory pseudopolyps in the post anastomotic area. The bulb is closed, second portion is normal. The area of the  major papilla appears normal but the papilla its self could not be identified. patchy superficial duodenitis in third and fourth portions of the duodenum, biopsies taken. Therapies:    biopsy of stomach body  biopsy of duodenal second portion, third portion    Specimen:   Two. Complications:   None    EBL:  Minimal.  IMPRESSION: sp partial gastrectomy with Billroth 2 gastro-jejunostomy. Atrophic and biliary gastritis with mild gastrojejunal anastomotic stenosis causing minimal gastric retention. Afferent loop mild patchy atrophic duodenitis. biopsies          Recommendations: -Continue acid suppression. , -Await pathology. , -Low residue, diabetic and GERD diet: avoid fried and fatty foods.  peppermint, chocolate, alcohol, coffee, citrus fruits and juices, tomoato products; avoid lying down for 2 to 3 hours after eating., -Follow up with me., -No NSAIDS, -needs to take iron and Vit B12 supplementation  Assistant: none    Mague Recio MD  7/5/2017  9:00 AM

## 2017-09-18 ENCOUNTER — HOSPITAL ENCOUNTER (OUTPATIENT)
Dept: PREADMISSION TESTING | Age: 82
Discharge: HOME | End: 2017-09-18
Payer: MEDICARE

## 2017-09-18 VITALS — BODY MASS INDEX: 37.18 KG/M2 | WEIGHT: 251 LBS | HEIGHT: 69 IN

## 2017-09-18 LAB
ANION GAP SERPL CALC-SCNC: 10 MMOL/L (ref 3–18)
ATRIAL RATE: 267 BPM
BUN SERPL-MCNC: 17 MG/DL (ref 7–18)
BUN/CREAT SERPL: 15 (ref 12–20)
CALCIUM SERPL-MCNC: 8.6 MG/DL (ref 8.5–10.1)
CALCULATED R AXIS, ECG10: 16 DEGREES
CALCULATED T AXIS, ECG11: 38 DEGREES
CHLORIDE SERPL-SCNC: 100 MMOL/L (ref 100–108)
CO2 SERPL-SCNC: 26 MMOL/L (ref 21–32)
CREAT SERPL-MCNC: 1.15 MG/DL (ref 0.6–1.3)
DIAGNOSIS, 93000: NORMAL
ERYTHROCYTE [DISTWIDTH] IN BLOOD BY AUTOMATED COUNT: 19.6 % (ref 11.6–14.5)
EST. AVERAGE GLUCOSE BLD GHB EST-MCNC: 160 MG/DL
GLUCOSE SERPL-MCNC: 152 MG/DL (ref 74–99)
HBA1C MFR BLD: 7.2 % (ref 4.5–5.6)
HCT VFR BLD AUTO: 37.9 % (ref 36–48)
HGB BLD-MCNC: 11.1 G/DL (ref 13–16)
MCH RBC QN AUTO: 24.3 PG (ref 24–34)
MCHC RBC AUTO-ENTMCNC: 29.3 G/DL (ref 31–37)
MCV RBC AUTO: 82.9 FL (ref 74–97)
PLATELET # BLD AUTO: 295 K/UL (ref 135–420)
PMV BLD AUTO: 9.5 FL (ref 9.2–11.8)
POTASSIUM SERPL-SCNC: 4.4 MMOL/L (ref 3.5–5.5)
Q-T INTERVAL, ECG07: 362 MS
QRS DURATION, ECG06: 112 MS
QTC CALCULATION (BEZET), ECG08: 430 MS
RBC # BLD AUTO: 4.57 M/UL (ref 4.7–5.5)
SODIUM SERPL-SCNC: 136 MMOL/L (ref 136–145)
VENTRICULAR RATE, ECG03: 85 BPM
WBC # BLD AUTO: 6.1 K/UL (ref 4.6–13.2)

## 2017-09-18 PROCEDURE — 83036 HEMOGLOBIN GLYCOSYLATED A1C: CPT | Performed by: ORTHOPAEDIC SURGERY

## 2017-09-18 PROCEDURE — 93005 ELECTROCARDIOGRAM TRACING: CPT

## 2017-09-18 PROCEDURE — 85027 COMPLETE CBC AUTOMATED: CPT | Performed by: ORTHOPAEDIC SURGERY

## 2017-09-18 PROCEDURE — 80048 BASIC METABOLIC PNL TOTAL CA: CPT | Performed by: ORTHOPAEDIC SURGERY

## 2017-09-18 RX ORDER — ACETAMINOPHEN 500 MG
1000 TABLET ORAL 2 TIMES DAILY
COMMUNITY

## 2017-09-18 NOTE — PERIOP NOTES
\"Well, I don't remember, the echocardiogram, it has been over 10 years for both a stress test and echocardiogram\"

## 2017-09-18 NOTE — PERIOP NOTES
Patient states, Eliquis instructions from Dr Ayden Torres was to stop 7 days prior, and Dr Jinny Rockwell instructions were 5 days. Dr Ayden Torres office called left message for [facility] for clarity of anticoagulant management in the justin op period.

## 2017-09-26 PROBLEM — M19.032 LOCALIZED PRIMARY OSTEOARTHROSIS OF CARPOMETACARPAL JOINT OF LEFT WRIST: Chronic | Status: ACTIVE | Noted: 2017-09-26

## 2017-12-13 ENCOUNTER — HOSPITAL ENCOUNTER (OUTPATIENT)
Dept: PREADMISSION TESTING | Age: 82
Discharge: HOME | End: 2017-12-13
Payer: MEDICARE

## 2017-12-13 LAB
ANION GAP SERPL CALC-SCNC: 6 MMOL/L (ref 3–18)
ATRIAL RATE: 98 BPM
BASOPHILS # BLD: 0 K/UL (ref 0–0.06)
BASOPHILS NFR BLD: 0 % (ref 0–2)
BUN SERPL-MCNC: 17 MG/DL (ref 7–18)
BUN/CREAT SERPL: 15 (ref 12–20)
CALCIUM SERPL-MCNC: 9.3 MG/DL (ref 8.5–10.1)
CALCULATED R AXIS, ECG10: 59 DEGREES
CALCULATED T AXIS, ECG11: 49 DEGREES
CHLORIDE SERPL-SCNC: 102 MMOL/L (ref 100–108)
CO2 SERPL-SCNC: 31 MMOL/L (ref 21–32)
CREAT SERPL-MCNC: 1.13 MG/DL (ref 0.6–1.3)
DIAGNOSIS, 93000: NORMAL
DIFFERENTIAL METHOD BLD: ABNORMAL
EOSINOPHIL # BLD: 0.2 K/UL (ref 0–0.4)
EOSINOPHIL NFR BLD: 3 % (ref 0–5)
ERYTHROCYTE [DISTWIDTH] IN BLOOD BY AUTOMATED COUNT: 22 % (ref 11.6–14.5)
EST. AVERAGE GLUCOSE BLD GHB EST-MCNC: 114 MG/DL
GLUCOSE SERPL-MCNC: 129 MG/DL (ref 74–99)
HBA1C MFR BLD: 5.6 % (ref 4.5–5.6)
HCT VFR BLD AUTO: 38.2 % (ref 36–48)
HGB BLD-MCNC: 10.9 G/DL (ref 13–16)
LYMPHOCYTES # BLD: 1 K/UL (ref 0.9–3.6)
LYMPHOCYTES NFR BLD: 13 % (ref 21–52)
MCH RBC QN AUTO: 24.3 PG (ref 24–34)
MCHC RBC AUTO-ENTMCNC: 28.5 G/DL (ref 31–37)
MCV RBC AUTO: 85.1 FL (ref 74–97)
MONOCYTES # BLD: 1.1 K/UL (ref 0.05–1.2)
MONOCYTES NFR BLD: 14 % (ref 3–10)
NEUTS SEG # BLD: 5.3 K/UL (ref 1.8–8)
NEUTS SEG NFR BLD: 70 % (ref 40–73)
PLATELET # BLD AUTO: 262 K/UL (ref 135–420)
PMV BLD AUTO: 9 FL (ref 9.2–11.8)
POTASSIUM SERPL-SCNC: 4.6 MMOL/L (ref 3.5–5.5)
Q-T INTERVAL, ECG07: 394 MS
QRS DURATION, ECG06: 112 MS
QTC CALCULATION (BEZET), ECG08: 425 MS
RBC # BLD AUTO: 4.49 M/UL (ref 4.7–5.5)
RBC MORPH BLD: ABNORMAL
RBC MORPH BLD: ABNORMAL
SODIUM SERPL-SCNC: 139 MMOL/L (ref 136–145)
VENTRICULAR RATE, ECG03: 70 BPM
WBC # BLD AUTO: 7.6 K/UL (ref 4.6–13.2)

## 2017-12-13 PROCEDURE — 83036 HEMOGLOBIN GLYCOSYLATED A1C: CPT | Performed by: UROLOGY

## 2017-12-13 PROCEDURE — 85025 COMPLETE CBC W/AUTO DIFF WBC: CPT | Performed by: UROLOGY

## 2017-12-13 PROCEDURE — 93005 ELECTROCARDIOGRAM TRACING: CPT

## 2017-12-13 PROCEDURE — 80048 BASIC METABOLIC PNL TOTAL CA: CPT | Performed by: UROLOGY

## 2017-12-13 RX ORDER — SODIUM CHLORIDE, SODIUM LACTATE, POTASSIUM CHLORIDE, CALCIUM CHLORIDE 600; 310; 30; 20 MG/100ML; MG/100ML; MG/100ML; MG/100ML
125 INJECTION, SOLUTION INTRAVENOUS CONTINUOUS
Status: CANCELLED | OUTPATIENT
Start: 2017-12-13

## 2017-12-13 RX ORDER — LEVOFLOXACIN 5 MG/ML
500 INJECTION, SOLUTION INTRAVENOUS ONCE
Status: CANCELLED | OUTPATIENT
Start: 2017-12-13 | End: 2017-12-13

## 2018-01-11 ENCOUNTER — ANESTHESIA EVENT (OUTPATIENT)
Dept: SURGERY | Age: 83
End: 2018-01-11
Payer: MEDICARE

## 2018-01-11 RX ORDER — SODIUM CHLORIDE 0.9 % (FLUSH) 0.9 %
5-10 SYRINGE (ML) INJECTION AS NEEDED
Status: CANCELLED | OUTPATIENT
Start: 2018-01-11

## 2018-01-11 RX ORDER — DEXTROSE 50 % IN WATER (D50W) INTRAVENOUS SYRINGE
25-50 AS NEEDED
Status: CANCELLED | OUTPATIENT
Start: 2018-01-11

## 2018-01-11 RX ORDER — SODIUM CHLORIDE, SODIUM LACTATE, POTASSIUM CHLORIDE, CALCIUM CHLORIDE 600; 310; 30; 20 MG/100ML; MG/100ML; MG/100ML; MG/100ML
125 INJECTION, SOLUTION INTRAVENOUS CONTINUOUS
Status: CANCELLED | OUTPATIENT
Start: 2018-01-11

## 2018-01-11 RX ORDER — FENTANYL CITRATE 50 UG/ML
50 INJECTION, SOLUTION INTRAMUSCULAR; INTRAVENOUS
Status: CANCELLED | OUTPATIENT
Start: 2018-01-11

## 2018-01-11 RX ORDER — INSULIN LISPRO 100 [IU]/ML
INJECTION, SOLUTION INTRAVENOUS; SUBCUTANEOUS ONCE
Status: CANCELLED | OUTPATIENT
Start: 2018-01-11 | End: 2018-01-12

## 2018-01-11 NOTE — H&P
A    Urology Rajesh Edilberto  [address] [place] [address], [address], [address] [facility]  phone: [phone]  fax: [phone]          Patient: Reynaldo Caballero  YOB: 1935  Date: 01/03/2018 7:22 AM   Visit Type: Chart Update      Assessment/Plan  # Detail Type Description    1. Assessment Enlarged prostate w/ LUTS (N40.1). Patient Plan Pt with a hx of urinary difficulties he underwent a uroflow study 2mo ago revealing evidence of obstruction. At this time he has decided to undergo surgical intervention. I reviewed options and he would like to proceed with a greenlight laser of the prostate all risks pain, infection, bleeding, post op retention, need for catheter and retrograde ejaculation were reviewed he understands and wishes to proceed. 2. Assessment Feeling of incomplete bladder emptying (R39.14). 3. Assessment Frequency of micturition (R35.0). 4. Assessment Poor urinary stream (R39.12). 5. Assessment Testicular hypofunction (E29.1). Patient Plan Pt continues to use 300 IM injections every 2wks this is working well for him his HCT is lower at 38% he will continue with current dosing at this time. This 81year old male presents for BPH, Elevated PSA and Hypogonadism. History of Present Illness:  1. BPH      Onset was gradual. It occurs daily. The problem is with no change. Associated symptoms include nocturia (3 times per night), urgency and urinary frequency. Pertinent negatives include chills, constipation, dysuria, fever, hematuria and urinary incontinence. Additional information: Pt having worsening of urinary symptoms incuding frequency urge, urge incontinence (rarely) and nocturia. He underwent uroflow which revealed obstruction. He did not want to undergo surgery. He now wants to have surgery, I reviewed options w the patient and he wants to proceed with Greenlight laser of prostate. .      2.  Elevated PSA      The onset of symptoms was gradual. The patient's PSA has risen since last monitored. Pertinent history includes age over 48, family history of prostate cancer, prior prostatitis and prior UTIs. The patient does not report modifying factors such as medication, instrumentation, recent ejaculation or recent 5 LUIGI. Associated symptoms include nocturia , urinary urgency and frequent urination. Pertinent negatives include dysuria, hematuria and urinary incontinence. Additional information: Pt noted to have a rise in PSA to 4.11. However since he is [de-identified] old I do not feel any intervention needed at this time. I will repeat this in one year. 11/29/17: This was not discussed at todays visit. 3.  Hypogonadism      The symptoms began gradually, have been moderate and are improving. The patient is here today for a follow up visit. The patient states he does have difficultly attaining an erection and has difficulty maintaining an erection. Pertinent history includes hypertension but not diabetes, hyperlipidemia or neurologic disease. Reviewed today was a PSA taken on 08/30/2016 with findings of 4.11 ng/mL. He also complains of difficulty achieving erections and difficulty maintaining erections but denies depression, morning erections or nocturnal erections. Additional information: Pt was 300mg every 3 weeks. This is working well. However he was having alot of hot flashes. He is now down to every 2 weeks and no hot flashes. His HCT is stable at 45%. 11/29/2017: This was not discussed at todays visit. PAST MEDICAL/SURGICAL HISTORY   (Reviewed, updated)    Disease/disorder Onset Date Management Date Comments   Cancer, skin 2016      Stroke 10/2015        Cystoscopy 09/26/2017    GERD  EGD with biopsy 07/05/2017      Lt.  Knee replacement revision 06/03/2014      Knee replacement 2014      CARDIOVERSION 02/22/2013    Hypogonadism  Testopel Insertion 2012      Hydrocele repair 2004      Knee replacement 2000 Bleeding Ulcer  Surgery on duodenum 1965    Spinal Stenosis       Hypertension       Thyroid disease       Arthritis       Hypogonadism       Multiple myeloma  Seven knee operations     Pernicious anemia       Cardiac arrythmia       Diabetes       Erectile dysfunction       Prostatitis         Carpal tunnel release       DIAGNOSTICS HISTORY:  Test Ordered Interpretation Result completed   *Echocardiography Trans Complete  see detail EF 0.55 (55%). No ASD/PFO 10/30/2015   Echo/MUGA - Ejection Fraction 10/30/2015  EF 0.55 (55%) 2015     Test Ordered Ordering Comments Modifier   *Echocardiography Trans Complete      Echo/MUGA - Ejection Fraction 10/30/2015         PROBLEM LIST:  Problem Description Onset Date   Type II diabetes mellitus w/o complication    Atrial fibrillation 2013   Benign essential hypertension 2012   Testicular hypofunction 2009   Gastroesophageal reflux disease 2012   Hyperlipidemia 2012   Impotence of organic origin 2012   Monoclonal paraproteinemia 10/01/2012     Allergies  Ingredient Reaction Medication Name Comment   PIROXICAM rash Feldene    PREDNISOLONE      OXYCODONE Nausea     DULOXETINE HCL ,diff urinating, htn, Cymbalta    NUT. TX., METABOLIC DISORDER, METHIONINE-FREE   Methionaid   MERBROMIN   Mercurochrome     Family History:  (Reviewed, updated)  Relationship Family Member Name  Age at Death Condition Onset Age Cause of Death       Family history of Thyroid disorder  N       Family history of Hypercholestemia  N       Family history of Diabetes mellitus  N       Family history of Heart disease  N       Family history of Obesity  N       Family history of Hypertension  N   Family h/o    Cancer - prostate     Father  Y 78 Congestive heart failure 78 N   Mother  Y 76 Congestive heart failure 76 N         Social History:  (Reviewed, updated)  Preferred language is [place].     MARITAL STATUS/FAMILY/SOCIAL SUPPORT  Currently . Tobacco use status: Never smoked tobacco.  Smoking status: Never smoker. No passive smoke exposure. ALCOHOL  There is no history of alcohol use. CAFFEINE  The patient does not use caffeine. LIFESTYLE  DIET  low fat. Review of Systems  System Neg/Pos Details   Constitutional Negative Chills, fever and weight loss. Respiratory Negative Chronic cough. Cardio Negative Chest pain. GI Negative Abdominal pain, blood in stool, constipation, diarrhea, nausea and vomiting.  Positive Difficulty achieving erections, Difficulty maintaining erections, Nocturia, Urgency, Urinary frequency.  Negative Dysuria, hematuria, morning erections, nocturnal erections and urinary incontinence. Neuro Negative Headache, poor or worsening memory, seizures and tremors. Psych Negative Depression and insomnia. Integumentary Negative Hives, itching skin and rash. MS Negative Back pain, difficulty walking, joint pain and neck pain. Hema/Lymph Negative Easy bleeding, lymphadenopathy and petechiae. Allergic/Immuno Negative Contact allergy. Physical Exam  Exam Findings Details   Constitutional Normal Well developed. Neck Exam Normal Inspection - Normal.   Respiratory Normal Inspection - Normal.   Extremity Normal No edema. Neurological Normal Alert and oriented to person, place and time. Cranial nerves intact. No motor or sensory deficits. Psychiatric Normal Oriented to time, place, person and situation. Appropriate mood and affect.          Medications (added, continued, or stopped today)  Started Medication Directions Instruction Stopped    B12/LEVOMEFOLATE CALCIUM/B-6       biotin      12/18/2017 CELEBREX  200MG  CAP TAKE 1 CAPSULE BY MOUTH  EVERY DAY AS NEEDED     08/03/2017 Celexa 10 mg tablet take 1 tablet by oral route  every day      Co Q-10 100 mg capsule      05/23/2014 Contour Test Strips use to test sugar bid 100 strips    09/06/2016 Eliquis 5 mg tablet take 1 tablet by oral route 2 times every day      Fish Oil 1,000 mg capsule take 1 Capsule by oral route  every day     10/01/2017 GLIMEPIRIDE 1 MG TABLET TAKE A 1/2 TO 1 TABLET BYMOUTH EVERY DAY     01/05/2017 lorazepam 0.5 mg tablet take 1/2-1 by oral route daily  as needed for anxiety     04/16/2015 losartan 50 mg tablet take 1 tablet by oral route  every day     02/22/2017 METFORMIN  500MG  TAB Take 2 tablets by mouth  twice a day with the  morning and evening meals     11/27/2017 needle (disp) 18 gauge x 1\" use as needed     08/12/2009 OMEGA-3      09/25/2017 omeprazole 20 mg capsule,delayed release take 1 capsule by oral route 2 times every day without food     04/20/2017 ONE TOUCH ULTRA BLUE TESTST(NEW)100 USE TO CHECK BLOOD SUGAR TWICE DAILY     04/20/2017 ONE TOUCH ULTRASOFT LANCETS 100'S USE TO CHECK BLOOD SUGAR TWICE DAILY     02/22/2017 PRAVASTATIN SODIUM  40MG  TAB Take 1 tablet by mouth  every day     10/02/2017 SYNTHROID 100 MCG TABLET TAKE 1 TABLET EVERY DAY     12/01/2017 Syringe 3 mL 21 gauge x 1\" Use to give testosterone injections     12/01/2017 testosterone enanthate 200 mg/mL intramuscular oil inject 2 milliliter by INTRAMUSCULAR route  every 2 weeks called in to Jennifer. MARIBELL     timolol maleate      05/23/2014 Travatan Z 0.004 % eye drops instill 1 drop by ophthalmic route  every day into affected eye(s) in the evening     02/08/2017 triamcinolone acetonide 0.1 % topical cream apply by topical route 3 times every day a thin layer to the affected area(s)     04/05/2017 Tylenol Extra Strength 500 mg tablet take 2 tablet by oral route  every 6 hours as needed     05/23/2014 Ultra Thin Lancets 31 gauge use to check sugar bid     05/31/2017 Vitamin D2 50,000 unit capsule take 1 capsule by oral route  every week  04/30/2018   Completed by:        Clemencia Moran  01/03/2018 7:26 AM   Document generated by:  Laura Krueger 01/03/2018 07:26 AM      -----------------------------------------------------------------------------------------------------------    Electronically signed by Madelin Ramos MD on 01/07/2018 05:04 PM

## 2018-01-12 ENCOUNTER — ANESTHESIA (OUTPATIENT)
Dept: SURGERY | Age: 83
End: 2018-01-12
Payer: MEDICARE

## 2018-01-12 ENCOUNTER — HOSPITAL ENCOUNTER (OUTPATIENT)
Age: 83
Setting detail: OUTPATIENT SURGERY
Discharge: HOME | End: 2018-01-12
Attending: UROLOGY | Admitting: UROLOGY
Payer: MEDICARE

## 2018-01-12 VITALS
HEART RATE: 68 BPM | BODY MASS INDEX: 35.39 KG/M2 | WEIGHT: 247.19 LBS | DIASTOLIC BLOOD PRESSURE: 77 MMHG | RESPIRATION RATE: 18 BRPM | SYSTOLIC BLOOD PRESSURE: 127 MMHG | HEIGHT: 70 IN | OXYGEN SATURATION: 97 % | TEMPERATURE: 97.4 F

## 2018-01-12 LAB
GLUCOSE BLD STRIP.AUTO-MCNC: 126 MG/DL (ref 70–110)
GLUCOSE BLD STRIP.AUTO-MCNC: 137 MG/DL (ref 70–110)

## 2018-01-12 PROCEDURE — 77030020782 HC GWN BAIR PAWS FLX 3M -B: Performed by: UROLOGY

## 2018-01-12 PROCEDURE — 76010000161 HC OR TIME 1 TO 1.5 HR INTENSV-TIER 1: Performed by: UROLOGY

## 2018-01-12 PROCEDURE — 74011250637 HC RX REV CODE- 250/637: Performed by: UROLOGY

## 2018-01-12 PROCEDURE — 77030005520 HC CATH URETH FOL38 BARD -A: Performed by: UROLOGY

## 2018-01-12 PROCEDURE — 76210000063 HC OR PH I REC FIRST 0.5 HR: Performed by: UROLOGY

## 2018-01-12 PROCEDURE — 77030018831 HC SOL IRR H20 BAXT -A: Performed by: UROLOGY

## 2018-01-12 PROCEDURE — 77030012508 HC MSK AIRWY LMA AMBU -A: Performed by: ANESTHESIOLOGY

## 2018-01-12 PROCEDURE — 74011000250 HC RX REV CODE- 250

## 2018-01-12 PROCEDURE — 77030018846 HC SOL IRR STRL H20 ICUM -A: Performed by: UROLOGY

## 2018-01-12 PROCEDURE — 76060000033 HC ANESTHESIA 1 TO 1.5 HR: Performed by: UROLOGY

## 2018-01-12 PROCEDURE — 74011250636 HC RX REV CODE- 250/636: Performed by: UROLOGY

## 2018-01-12 PROCEDURE — 82962 GLUCOSE BLOOD TEST: CPT

## 2018-01-12 PROCEDURE — 76210000021 HC REC RM PH II 0.5 TO 1 HR: Performed by: UROLOGY

## 2018-01-12 PROCEDURE — 74011000272 HC RX REV CODE- 272: Performed by: UROLOGY

## 2018-01-12 PROCEDURE — 77030018836 HC SOL IRR NACL ICUM -A: Performed by: UROLOGY

## 2018-01-12 PROCEDURE — 74011250636 HC RX REV CODE- 250/636

## 2018-01-12 RX ORDER — SODIUM CHLORIDE 0.9 % (FLUSH) 0.9 %
5-10 SYRINGE (ML) INJECTION AS NEEDED
Status: CANCELLED | OUTPATIENT
Start: 2018-01-12

## 2018-01-12 RX ORDER — EPHEDRINE SULFATE/0.9% NACL/PF 25 MG/5 ML
SYRINGE (ML) INTRAVENOUS AS NEEDED
Status: DISCONTINUED | OUTPATIENT
Start: 2018-01-12 | End: 2018-01-12 | Stop reason: HOSPADM

## 2018-01-12 RX ORDER — FUROSEMIDE 10 MG/ML
INJECTION INTRAMUSCULAR; INTRAVENOUS AS NEEDED
Status: DISCONTINUED | OUTPATIENT
Start: 2018-01-12 | End: 2018-01-12 | Stop reason: HOSPADM

## 2018-01-12 RX ORDER — SODIUM CHLORIDE, SODIUM LACTATE, POTASSIUM CHLORIDE, CALCIUM CHLORIDE 600; 310; 30; 20 MG/100ML; MG/100ML; MG/100ML; MG/100ML
125 INJECTION, SOLUTION INTRAVENOUS CONTINUOUS
Status: DISCONTINUED | OUTPATIENT
Start: 2018-01-12 | End: 2018-01-12 | Stop reason: HOSPADM

## 2018-01-12 RX ORDER — ONDANSETRON 2 MG/ML
INJECTION INTRAMUSCULAR; INTRAVENOUS AS NEEDED
Status: DISCONTINUED | OUTPATIENT
Start: 2018-01-12 | End: 2018-01-12 | Stop reason: HOSPADM

## 2018-01-12 RX ORDER — SODIUM CHLORIDE 0.9 % (FLUSH) 0.9 %
5-10 SYRINGE (ML) INJECTION EVERY 8 HOURS
Status: CANCELLED | OUTPATIENT
Start: 2018-01-12

## 2018-01-12 RX ORDER — LEVOFLOXACIN 5 MG/ML
500 INJECTION, SOLUTION INTRAVENOUS ONCE
Status: COMPLETED | OUTPATIENT
Start: 2018-01-12 | End: 2018-01-12

## 2018-01-12 RX ORDER — LIDOCAINE HYDROCHLORIDE 20 MG/ML
INJECTION, SOLUTION EPIDURAL; INFILTRATION; INTRACAUDAL; PERINEURAL AS NEEDED
Status: DISCONTINUED | OUTPATIENT
Start: 2018-01-12 | End: 2018-01-12 | Stop reason: HOSPADM

## 2018-01-12 RX ORDER — FENTANYL CITRATE 50 UG/ML
INJECTION, SOLUTION INTRAMUSCULAR; INTRAVENOUS AS NEEDED
Status: DISCONTINUED | OUTPATIENT
Start: 2018-01-12 | End: 2018-01-12 | Stop reason: HOSPADM

## 2018-01-12 RX ORDER — MIDAZOLAM HYDROCHLORIDE 1 MG/ML
INJECTION, SOLUTION INTRAMUSCULAR; INTRAVENOUS AS NEEDED
Status: DISCONTINUED | OUTPATIENT
Start: 2018-01-12 | End: 2018-01-12 | Stop reason: HOSPADM

## 2018-01-12 RX ORDER — DEXTROSE 50 % IN WATER (D50W) INTRAVENOUS SYRINGE
25-50 AS NEEDED
Status: DISCONTINUED | OUTPATIENT
Start: 2018-01-12 | End: 2018-01-12 | Stop reason: HOSPADM

## 2018-01-12 RX ORDER — PROPOFOL 10 MG/ML
INJECTION, EMULSION INTRAVENOUS AS NEEDED
Status: DISCONTINUED | OUTPATIENT
Start: 2018-01-12 | End: 2018-01-12 | Stop reason: HOSPADM

## 2018-01-12 RX ORDER — ATROPA BELLADONNA AND OPIUM 16.2; 3 MG/1; MG/1
SUPPOSITORY RECTAL AS NEEDED
Status: DISCONTINUED | OUTPATIENT
Start: 2018-01-12 | End: 2018-01-12 | Stop reason: HOSPADM

## 2018-01-12 RX ORDER — INSULIN LISPRO 100 [IU]/ML
INJECTION, SOLUTION INTRAVENOUS; SUBCUTANEOUS ONCE
Status: DISCONTINUED | OUTPATIENT
Start: 2018-01-12 | End: 2018-01-12 | Stop reason: HOSPADM

## 2018-01-12 RX ADMIN — LEVOFLOXACIN 500 MG: 5 INJECTION, SOLUTION INTRAVENOUS at 08:12

## 2018-01-12 RX ADMIN — ONDANSETRON 4 MG: 2 INJECTION INTRAMUSCULAR; INTRAVENOUS at 08:04

## 2018-01-12 RX ADMIN — FENTANYL CITRATE 25 MCG: 50 INJECTION, SOLUTION INTRAMUSCULAR; INTRAVENOUS at 08:21

## 2018-01-12 RX ADMIN — MIDAZOLAM HYDROCHLORIDE 2 MG: 1 INJECTION, SOLUTION INTRAMUSCULAR; INTRAVENOUS at 08:04

## 2018-01-12 RX ADMIN — Medication 10 MG: at 09:00

## 2018-01-12 RX ADMIN — PROPOFOL 40 MG: 10 INJECTION, EMULSION INTRAVENOUS at 08:18

## 2018-01-12 RX ADMIN — PROPOFOL 150 MG: 10 INJECTION, EMULSION INTRAVENOUS at 08:09

## 2018-01-12 RX ADMIN — FUROSEMIDE 10 MG: 10 INJECTION INTRAMUSCULAR; INTRAVENOUS at 09:18

## 2018-01-12 RX ADMIN — LIDOCAINE HYDROCHLORIDE 80 MG: 20 INJECTION, SOLUTION EPIDURAL; INFILTRATION; INTRACAUDAL; PERINEURAL at 08:09

## 2018-01-12 RX ADMIN — SODIUM CHLORIDE, SODIUM LACTATE, POTASSIUM CHLORIDE, AND CALCIUM CHLORIDE: 600; 310; 30; 20 INJECTION, SOLUTION INTRAVENOUS at 08:57

## 2018-01-12 RX ADMIN — SODIUM CHLORIDE, SODIUM LACTATE, POTASSIUM CHLORIDE, AND CALCIUM CHLORIDE 125 ML/HR: 600; 310; 30; 20 INJECTION, SOLUTION INTRAVENOUS at 07:16

## 2018-01-12 RX ADMIN — Medication 10 MG: at 08:35

## 2018-01-12 RX ADMIN — FENTANYL CITRATE 50 MCG: 50 INJECTION, SOLUTION INTRAMUSCULAR; INTRAVENOUS at 09:14

## 2018-01-12 RX ADMIN — FENTANYL CITRATE 25 MCG: 50 INJECTION, SOLUTION INTRAMUSCULAR; INTRAVENOUS at 08:51

## 2018-01-12 RX ADMIN — Medication 10 MG: at 08:42

## 2018-01-12 NOTE — PERIOP NOTES
TRANSFER - IN REPORT:    Verbal report received from ORN and CRNA(name) on Trever Oliva  being received from OR(unit) for routine progression of care      Report consisted of patients Situation, Background, Assessment and   Recommendations(SBAR). Information from the following report(s) SBAR, Kardex, OR Summary, Procedure Summary, MAR and Recent Results was reviewed with the receiving nurse. Opportunity for questions and clarification was provided. Assessment completed upon patients arrival to unit and care assumed.

## 2018-01-12 NOTE — INTERVAL H&P NOTE
H&P Update:  Allyssa Martines was seen and examined. History and physical has been reviewed. The patient has been examined.  There have been no significant clinical changes since the completion of the originally dated History and Physical.    Signed By: Elsa Kinsey MD     January 12, 2018 7:51 AM

## 2018-01-12 NOTE — DISCHARGE INSTRUCTIONS
Artis Primer. Marit Barthel, M.D. [facility]  [address], [address] [place], [place]  Office: [phone]  Fax:    [phone]: Procedure(s):  GREENLIGHT LASER OF THE PROSTATE \"SPEC POP\"    Notify [facility] IMMEDIATELY if any of the following occur:     You are unable to urinate. Urgency to urinate is not uncommon.  You find yourself urinating small frequent amounts associated with severe lower abdominal discomfort.  Bright red blood with clots in the urine. Some reddish urine is not uncommon and should be treated with increasing the amount of fluids you drink.  Temperature above 101.5° and / or chills.  You are nauseous and / or vomiting and you cannot hold down any fluids.  Your pain is not controlled with the pain medication prescribed. Special Considerations:      Do not drive for at least 24 hours after the procedure and until you are no longer taking narcotic pain medication and you are able to move and react without hesitation. MEDICATIONS:  Pain   []  Norco®   []  Percocet® [x]  Tramadol      Antibiotics   []  Cipro   [x]  Keflex    [] Levaquin   []  Bactrim DS®       Urgency   []  Vesicare®   []       Burning   []  Pyridium®   []   UribelTM     Nausea   []  Zofran®   []    Phenergan®     Miscellaneous         [x] Prescriptions Written on Chart    [] Prescriptions sent Electronically           Our office will call you tomorrow to schedule your first follow-up appointment. Please contact [place] [address] [facility] at [phone] or go to the nearest Emergency Department / Urgent Care facility for any other medical questions or concerns.       DISCHARGE SUMMARY from Nurse    PATIENT INSTRUCTIONS:    After general anesthesia or intravenous sedation, for 24 hours or while taking prescription Narcotics:  · Limit your activities  · Do not drive and operate hazardous machinery  · Do not make important personal or business decisions  · Do  not drink alcoholic beverages  · If you have not urinated within 8 hours after discharge, please contact your surgeon on call. Report the following to your surgeon:  · Excessive pain, swelling, redness or odor of or around the surgical area  · Temperature over 100.5  · Nausea and vomiting lasting longer than 4 hours or if unable to take medications  · Any signs of decreased circulation or nerve impairment to extremity: change in color, persistent  numbness, tingling, coldness or increase pain  · Any questions    What to do at Home:  Recommended activity: Activity as tolerated and No driving while on analgesics,     If you experience any of the following symptoms as above, please follow up with  Dr Mary Vargas. *  Please give a list of your current medications to your Primary Care Provider. *  Please update this list whenever your medications are discontinued, doses are      changed, or new medications (including over-the-counter products) are added. *  Please carry medication information at all times in case of emergency situations. These are general instructions for a healthy lifestyle:    No smoking/ No tobacco products/ Avoid exposure to second hand smoke  Surgeon General's Warning:  Quitting smoking now greatly reduces serious risk to your health. Obesity, smoking, and sedentary lifestyle greatly increases your risk for illness    A healthy diet, regular physical exercise & weight monitoring are important for maintaining a healthy lifestyle    You may be retaining fluid if you have a history of heart failure or if you experience any of the following symptoms:  Weight gain of 3 pounds or more overnight or 5 pounds in a week, increased swelling in our hands or feet or shortness of breath while lying flat in bed. Please call your doctor as soon as you notice any of these symptoms; do not wait until your next office visit.     Recognize signs and symptoms of STROKE:    F-face looks uneven    A-arms unable to move or move unevenly    S-speech slurred or non-existent    T-time-call 911 as soon as signs and symptoms begin-DO NOT go       Back to bed or wait to see if you get better-TIME IS BRAIN. Warning Signs of HEART ATTACK     Call 911 if you have these symptoms:   Chest discomfort. Most heart attacks involve discomfort in the center of the chest that lasts more than a few minutes, or that goes away and comes back. It can feel like uncomfortable pressure, squeezing, fullness, or pain.  Discomfort in other areas of the upper body. Symptoms can include pain or discomfort in one or both arms, the back, neck, jaw, or stomach.  Shortness of breath with or without chest discomfort.  Other signs may include breaking out in a cold sweat, nausea, or lightheadedness. Don't wait more than five minutes to call 911 - MINUTES MATTER! Fast action can save your life. Calling 911 is almost always the fastest way to get lifesaving treatment. Emergency Medical Services staff can begin treatment when they arrive -- up to an hour sooner than if someone gets to the hospital by car. The discharge information has been reviewed with the patient and caregiver. The patient and caregiver verbalized understanding. Discharge medications reviewed with the patient and caregiver and appropriate educational materials and side effects teaching were provided. Patient armband removed and shredded  ___________________________________________________________________________________________________________________________________

## 2018-01-12 NOTE — OP NOTES
OPERATIVE NOTE - GREEN LIGHT    Patient: Nelida Anne MRN: [id number]  SSN: [id number]    YOB: 1935  Age: 81 y.o. Sex: male      Date of Procedure:  1/12/2018   Preoperative Diagnosis:  BENIGH PROSTATIC HYPERTROPHY W/OBSTRUCTION  Postoperative Diagnosis:  BENIGH PROSTATIC HYPERTROPHY W/OBSTRUCTION    Procedure:  Procedure(s):  GREENLIGHT LASER OF THE PROSTATE \"SPEC POP\"  Surgeon:  Surgeon(s) and Role:     * Taylor Monte MD - Primary  Anesthesia:  General   Estimated Blood Loss:  Minimal  Specimens:  * No specimens in log *   Implants:  * No implants in log *    Findings:  Enlarged lateral lobes elevated bladder neck  Complications:  none    Procedure Details:    After informed consent was obtained, the patient was taken to the operating room, and he underwent laryngeal mask anesthesia in the supine position. He was then prepped and draped in the usual surgical fashion after being placed in the dorsal lithotomy position. A 23-Citizen of [place] laser cystoscope was inserted with visual obturator per urethra into the bladder. The ureteral orifices were identified. The verumontanum was identified. Next, the visual obturator was exchanged for a laser guide. The greenlight laser fiber was inserted through the laser guide. Lasering was begun at the bladder neck from 5 o'clock to the 7 o'clock position. Once the bladder neck was taken down, my attention then turned towards the right lobe from the bladder neck towards the verumontanum. The left lobe was taken down in a similar fashion. The remainder of the excess prostatic adenoma was removed with the laser. There was some areas of persistent bleeding,  Using the resectoscope and the button I cauterized any areas of bleeding. The bladder was redrained, refilled at low-volume. There was no active bleeding. 10 ml of Lasix was given IV to promote post op diuresis. The procedure ended at this point. [id number] joules were used.      Both ureteral orifices and the verumontanum were intact at the end of the procedure. The bladder was filled. The laser was placed on standby and the cystoscope was removed. A 22-Maori, 5 mL two-way Kirk catheter was inserted per urethra into the bladder draining clear urine. Ten mL of sterile water was placed in the balloon and the catheter was connected to a leg bag. The patient was then washed off, dried, and placed in the supine position. He was awakened from anesthesia and then transferred to the post anaesthesia care unit.            Ebony Woo MD  1/12/2018  9:34 AM

## 2018-01-12 NOTE — PERIOP NOTES
TRANSFER - OUT REPORT:    Verbal report given to CAMERON Coy(name) on Trever Hernández  being transferred to Phase 2(unit) for routine progression of care       Report consisted of patients Situation, Background, Assessment and   Recommendations(SBAR). Information from the following report(s) SBAR, Kardex, OR Summary, Procedure Summary, MAR and Recent Results was reviewed with the receiving nurse. Lines:   Peripheral IV 69/52/63 Left Cephalic (Active)   Site Assessment Clean, dry, & intact 1/12/2018  9:30 AM   Phlebitis Assessment 0 1/12/2018  9:30 AM   Infiltration Assessment 0 1/12/2018  9:30 AM   Dressing Status Clean, dry, & intact 1/12/2018  9:30 AM   Dressing Type Tape;Transparent 1/12/2018  9:30 AM   Hub Color/Line Status Pink; Infusing 1/12/2018  9:30 AM        Opportunity for questions and clarification was provided.       Patient transported with:   [employer]

## 2018-01-12 NOTE — ANESTHESIA POSTPROCEDURE EVALUATION
Post-Anesthesia Evaluation and Assessment    Cardiovascular Function/Vital Signs  Visit Vitals    /68    Pulse 81    Temp 36.6 °C (97.8 °F)    Resp 18    Ht 5' 9.75\" (1.772 m)    Wt 112.1 kg (247 lb 3 oz)    SpO2 97%    BMI 35.72 kg/m2       Patient is status post Procedure(s):  GREENLIGHT LASER OF THE PROSTATE \"SPEC POP\". Nausea/Vomiting: Controlled. Postoperative hydration reviewed and adequate. Pain:  Pain Scale 1: Visual (01/12/18 0930)  Pain Intensity 1: 0 (01/12/18 0930)   Managed. Neurological Status:   Neuro (WDL): Within Defined Limits (01/12/18 0930)   At baseline. Mental Status and Level of Consciousness: Baseline and stable. Pulmonary Status:   O2 Device: Room air (01/12/18 0950)   Adequate oxygenation and airway patent. Complications related to anesthesia: None    Post-anesthesia assessment completed. No concerns. Patient has met all discharge requirements.     Signed By: Haroon Novak MD

## 2018-01-12 NOTE — ANESTHESIA PREPROCEDURE EVALUATION
Anesthetic History   No history of anesthetic complications            Review of Systems / Medical History  Patient summary reviewed, nursing notes reviewed and pertinent labs reviewed    Pulmonary  Within defined limits                 Neuro/Psych       CVA  TIA and psychiatric history     Cardiovascular    Hypertension        Dysrhythmias : atrial fibrillation      Exercise tolerance: >4 METS     GI/Hepatic/Renal     GERD: well controlled      PUD     Endo/Other    Diabetes  Hypothyroidism  Arthritis     Other Findings              Physical Exam    Airway  Mallampati: III  TM Distance: 4 - 6 cm  Neck ROM: decreased range of motion   Mouth opening: Normal     Cardiovascular  Regular rate and rhythm,  S1 and S2 normal,  no murmur, click, rub, or gallop  Rhythm: regular  Rate: normal         Dental    Dentition: Caps/crowns     Pulmonary  Breath sounds clear to auscultation               Abdominal  GI exam deferred       Other Findings            Anesthetic Plan    ASA: 3  Anesthesia type: general          Induction: Intravenous  Anesthetic plan and risks discussed with: Patient      Patient aware of risk of dental damage periop. Patient aware of risks being off anticoagulants periop. Patient is following cardiologist's recommendation for stopping and restarting anticoagulants periop.

## 2018-01-12 NOTE — PERIOP NOTES
AVS med list reviewed and verified- no duplicates med noted. Discharge instruction done and instructed pt/so on emptying farr leg bag and removal with return demo. Opportunity for questions given pt/so and both verbalized understanding.

## 2018-01-12 NOTE — IP AVS SNAPSHOT
[address] [address] 
[phone] Patient: Edith Blackwood MRN: [id number] About your hospitalization You were admitted on:  January 12, 2018 You last received care in the:  [facility] PACU You were discharged on:  January 12, 2018 Why you were hospitalized Your primary diagnosis was:  Not on File Follow-up Information Follow up With Details Comments Contact Info Werner Rai [address] [facility] 150 
[phone] Laury Rubio MD  My office will call tomorrow for follow up appt. [address] [address] 
[phone] Discharge Orders None A check jojo indicates which time of day the medication should be taken. My Medications CONTINUE taking these medications Instructions Each Dose to Equal  
 Morning Noon Evening Bedtime  
 acetaminophen 500 mg tablet Commonly known as:  TYLENOL Your last dose was: Your next dose is: Take 1,000 mg by mouth two (2) times a day. 1000 mg  
    
   
   
   
  
 BIOTIN PO Your last dose was: Your next dose is: Take 1,000 mg by mouth daily. 1000 mg CeleBREX 200 mg capsule Generic drug:  celecoxib Your last dose was: Your next dose is: Take 200 mg by mouth daily. 200 mg  
    
   
   
   
  
 citalopram 10 mg tablet Commonly known as:  Iain Bird Your last dose was: Your next dose is: Take 10 mg by mouth daily (after dinner). 10 mg  
    
   
   
   
  
 COQ10  100-100 mg-unit Cap Generic drug:  coenzyme q10-vitamin e Your last dose was: Your next dose is: Take 300 mg by mouth daily. 300 mg  
    
   
   
   
  
 ELIQUIS 5 mg tablet Generic drug:  apixaban Your last dose was: Your next dose is: Take 5 mg by mouth two (2) times a day. 5 mg FISH OIL PO Your last dose was: Your next dose is: Take 1,200 mg by mouth every Monday, Wednesday, Friday. 1200 mg  
    
   
   
   
  
 glimepiride 1 mg tablet Commonly known as:  AMARYL Your last dose was: Your next dose is: Take 0.5 mg by mouth daily (with breakfast). 0.5 mg  
    
   
   
   
  
 glucosamine-chondroitin 750-600 mg Tab Your last dose was: Your next dose is: Take 2 Tabs by mouth daily. 2 Tab Iron 325 mg (65 mg iron) tablet Generic drug:  ferrous sulfate Your last dose was: Your next dose is: Take 65 mg by mouth every Monday, Wednesday, Friday. 65 mg  
    
   
   
   
  
 levothyroxine 100 mcg tablet Commonly known as:  SYNTHROID Your last dose was: Your next dose is: Take 100 mcg by mouth Daily (before breakfast). 100 mcg LORazepam 0.5 mg tablet Commonly known as:  ATIVAN Your last dose was: Your next dose is: Take 0.5 mg by mouth daily as needed for Anxiety. 0.5 mg  
    
   
   
   
  
 losartan 50 mg tablet Commonly known as:  COZAAR Your last dose was: Your next dose is: Take 50 mg by mouth daily. 50 mg  
    
   
   
   
  
 metFORMIN 500 mg tablet Commonly known as:  GLUCOPHAGE Your last dose was: Your next dose is: Take 1,000 mg by mouth two (2) times daily (with meals). Indications: TYPE 2 DIABETES MELLITUS  
 1000 mg  
    
   
   
   
  
 multivitamin tablet Commonly known as:  ONE A DAY Your last dose was: Your next dose is: Take 1 Tab by mouth daily. 1 Tab  
    
   
   
   
  
 omeprazole 20 mg capsule Commonly known as:  PRILOSEC  
   
 Your last dose was: Your next dose is: Take 20 mg by mouth daily. Indications: GASTROESOPHAGEAL REFLUX  
 20 mg  
    
   
   
   
  
 pravastatin 40 mg tablet Commonly known as:  PRAVACHOL Your last dose was: Your next dose is: Take 40 mg by mouth nightly. 40 mg PROBIOTIC 10 billion cell capsule Generic drug:  lactobacillus rhamnosus gg 10 billion cell Your last dose was: Your next dose is: Take 1 Cap by mouth every Monday, Wednesday, Friday. 1 Cap TESTOSTERONE CYPIONATE IM Your last dose was: Your next dose is:    
   
   
 1.5 mL by IntraMUSCular route Once every 2 weeks. 1.5 mL  
    
   
   
   
  
 timolol 0.5 % ophthalmic solution Commonly known as:  TIMOPTIC Your last dose was: Your next dose is:    
   
   
 Administer 1 Drop to right eye two (2) times a day. 1 Drop TRAVATAN Z OP Your last dose was: Your next dose is:    
   
   
 Apply 1 Drop to eye nightly. Both eyes 1 Drop VITAMIN B-12 1,000 mcg tablet Generic drug:  cyanocobalamin Your last dose was: Your next dose is:    
   
   
 2,500 mcg by SubLINGual route daily. 2500 mcg VITAMIN D2 50,000 unit capsule Generic drug:  ergocalciferol Your last dose was: Your next dose is: Take 50,000 Units by mouth Every Saturday. [address] Units Discharge Instructions Isabelle Jaramillo. Jeremiah Mota M.D. [address] Office: [phone] Fax:    [phone] PROCEDURE: Procedure(s): 
GREENLIGHT LASER OF THE PROSTATE \"SPEC POP\" [facility] if any of the following occur: ? You are unable to urinate. Urgency to urinate is not uncommon. ? You find yourself urinating small frequent amounts associated with severe lower abdominal discomfort. ? Bright red blood with clots in the urine. Some reddish urine is not uncommon and should be treated with increasing the amount of fluids you drink. ? Temperature above 101.5° and / or chills. ? You are nauseous and / or vomiting and you cannot hold down any fluids. ? Your pain is not controlled with the pain medication prescribed. Special Considerations:  
 
? Do not drive for at least 24 hours after the procedure and until you are no longer taking narcotic pain medication and you are able to move and react without hesitation. MEDICATIONS: 
Pain     Norco®     Percocet®   Tramadol Antibiotics     Cipro     Keflex     Levaquin     Bactrim DS® Urgency     Vesicare® Burning     Pyridium®      Belem Searing Nausea     Zofran®       Phenergan® Miscellaneous Prescriptions Written on Chart Prescriptions sent Electronically Our office will call you tomorrow to schedule your first follow-up appointment. Please contact [place] [address] [facility] at [phone] or go to the nearest Emergency Department / Urgent Care facility for any other medical questions or concerns. DISCHARGE SUMMARY from Nurse PATIENT INSTRUCTIONS: 
 
 
F-face looks uneven A-arms unable to move or move unevenly S-speech slurred or non-existent T-time-call 911 as soon as signs and symptoms begin-DO NOT go Back to bed or wait to see if you get better-TIME IS BRAIN. Warning Signs of HEART ATTACK Call 911 if you have these symptoms: 
? Chest discomfort.  Most heart attacks involve discomfort in the center of the chest that lasts more than a few minutes, or that goes away and comes back. It can feel like uncomfortable pressure, squeezing, fullness, or pain. ? Discomfort in other areas of the upper body. Symptoms can include pain or discomfort in one or both arms, the back, neck, jaw, or stomach. ? Shortness of breath with or without chest discomfort. ? Other signs may include breaking out in a cold sweat, nausea, or lightheadedness. Don't wait more than five minutes to call [address]! Fast action can save your life. Calling 911 is almost always the fastest way to get lifesaving treatment. Emergency Medical Services staff can begin treatment when they arrive  up to an hour sooner than if someone gets to the hospital by car. The discharge information has been reviewed with the patient and caregiver. The patient and caregiver verbalized understanding. Discharge medications reviewed with the patient and caregiver and appropriate educational materials and side effects teaching were provided. Patient {ARMBANDS:30839} 
___________________________________________________________________________________________________________________________________ Introducing [facility]! [address] introduces [employer] patient portal. Now you can access parts of your medical record, email your doctor's office, and request medication refills online. 1. In your internet browser, go to [email or web address] 2. Click on the First Time User? Click Here link in the Sign In box. You will see the New Member Sign Up page. 3. Enter your [employer] Access Code exactly as it appears below. You will not need to use this code after youve completed the sign-up process. If you do not sign up before the expiration date, you must request a new code. · [employer] Access Code: [email or web address] Expires: 3/13/2018 10:57 AM 
 
4.  Enter the last four digits of your Social Security Number (xxxx) and Date of Birth (mm/dd/yyyy) as indicated and click Submit. You will be taken to the next sign-up page. 5. Create a Metac[employer] ID. This will be your Metac[employer] login ID and cannot be changed, so think of one that is secure and easy to remember. 6. Create a [employer] password. You can change your password at any time. 7. Enter your Password Reset Question and Answer. This can be used at a later time if you forget your password. 8. Enter your e-mail address. You will receive e-mail notification when new information is available in [address] 9. Click Sign Up. You can now view and download portions of your medical record. 10. Click the Download Summary menu link to download a portable copy of your medical information. If you have questions, please visit the Frequently Asked Questions section of the [employer] website. Remember, [employer] is NOT to be used for urgent needs. For medical emergencies, dial 911. Now available from your iPhone and Android! Providers Seen During Your Hospitalization Provider Specialty Primary office phone Edith Austin MD Urology [phone] Your Primary Care Physician (PCP) Primary Care Physician Office Phone Office Fax [facility] [address], [address] [phone] You are allergic to the following Allergen Reactions Feldene (Piroxicam) Photosensitivity Monosodium Glutamate Nausea and Vomiting Pt states he went into a-fib too Prednisone Palpitations Elevated blood sugar very high and went into a-fib Mercurochrome (Merbromin) Rash Adhesive Rash Patient states he can tolerate and prefers paper tape Cymbalta (Duloxetine) Unknown (comments) Oxycodone Other (comments) Decreased apetite, insomnia, poor concentration, lasting 3 weeks after the last dose Recent Documentation Height Weight BMI Smoking Status 1.772 m 112.1 kg 35.72 kg/m2 Never Smoker Emergency Contacts Name Discharge Info Relation Home Work Mobile [address] CAREGIVER [3] Spouse [3] [phone] Patient Belongings The following personal items are in your possession at time of discharge: 
  Dental Appliances: None  Visual Aid: Glasses, At home      Home Medications: None   Jewelry: None  Clothing: Pants, Shirt, Undergarments, Footwear, Socks (lcoker 16)    Other Valuables: None (denies having any wallet or cell) Please provide this summary of care documentation to your next provider. Signatures-by signing, you are acknowledging that this After Visit Summary has been reviewed with you and you have received a copy. Patient Signature:  ____________________________________________________________ Date:  ____________________________________________________________  
  
[facility] Provider Signature:  ____________________________________________________________ Date:  ____________________________________________________________

## 2018-05-01 ENCOUNTER — HOSPITAL ENCOUNTER (OUTPATIENT)
Dept: LAB | Age: 83
Discharge: HOME | End: 2018-05-01
Payer: MEDICARE

## 2018-05-01 LAB — VIT B12 SERPL-MCNC: 1283 PG/ML (ref 211–911)

## 2018-05-01 PROCEDURE — 82607 VITAMIN B-12: CPT | Performed by: PSYCHIATRY & NEUROLOGY

## 2018-05-01 PROCEDURE — 36415 COLL VENOUS BLD VENIPUNCTURE: CPT | Performed by: PSYCHIATRY & NEUROLOGY

## 2018-05-02 LAB
FAX TO INFO,FAXT: NORMAL
FAX TO NUMBER,FAXN: NORMAL

## (undated) DEVICE — REM POLYHESIVE ADULT PATIENT RETURN ELECTRODE: Brand: VALLEYLAB

## (undated) DEVICE — STERILE POLYISOPRENE POWDER-FREE SURGICAL GLOVES: Brand: PROTEXIS

## (undated) DEVICE — (D)PREP SKN CHLRAPRP APPL 26ML -- CONVERT TO ITEM 371833

## (undated) DEVICE — BAG URIN LEG DISPOZ-A-BG 19OZ -- W/18IN EXT TUBING

## (undated) DEVICE — PACK,CYSTOSCOPY,PK III,AURORA: Brand: MEDLINE

## (undated) DEVICE — SOL IRR STRL H2O 3000ML BG -- USE ITEM 173640

## (undated) DEVICE — FORCEPS BX CAP 240CM L RAD JAW 4

## (undated) DEVICE — CTS RELIEF KIT, CARPAL TUNNEL SYNDROME RELIEF KIT: Brand: CTS RELIEF KIT

## (undated) DEVICE — STERILE POLYISOPRENE POWDER-FREE SURGICAL GLOVES WITH EMOLLIENT COATING: Brand: PROTEXIS

## (undated) DEVICE — BASIC SINGLE BASIN 1-LF: Brand: MEDLINE INDUSTRIES, INC.

## (undated) DEVICE — SPONGE GZ W4XL4IN COT 12 PLY TYP VII WVN C FLD DSGN

## (undated) DEVICE — BANDAGE COMPR W4INXL5YD ELAS CLP CLSR

## (undated) DEVICE — SINGLE PORT MANIFOLD: Brand: NEPTUNE 2

## (undated) DEVICE — TRAY PREP DRY W/ PREM GLV 2 APPL 6 SPNG 2 UNDPD 1 OVERWRAP

## (undated) DEVICE — (D)SYR 10ML 1/5ML GRAD NSAF -- PKGING CHANGE USE ITEM 338027

## (undated) DEVICE — (D)STRIP SKN CLSR 0.5X4IN WHT --

## (undated) DEVICE — MASTISOL ADHESIVE LIQ 2/3ML

## (undated) DEVICE — DEVON™ KNEE AND BODY STRAP 60" X 3" (1.5 M X 7.6 CM): Brand: DEVON

## (undated) DEVICE — SOL IRR STRL H2O 1500ML BTL --

## (undated) DEVICE — UTILITY MARKER,BLACK WITH LABELS: Brand: DEVON

## (undated) DEVICE — KENDALL RADIOLUCENT FOAM MONITORING ELECTRODE RECTANGULAR SHAPE: Brand: KENDALL

## (undated) DEVICE — TRAP SPEC COLL POLYP POLYSTYR --

## (undated) DEVICE — MOUTHPIECE ENDOSCP 20X27MM --

## (undated) DEVICE — TOWEL SURG W16XL26IN BLU NONFENESTRATED DLX ST 2 PER PK

## (undated) DEVICE — SET ADMIN IV PMP 20GTT 117IN -- 2 NDLS INJ SITE

## (undated) DEVICE — CATH URETH FOL 2W SH 22FRX5ML -- CONVERT TO ITEM 363075

## (undated) DEVICE — (D)PACK EXTREMITY CUSTOM -- DISC BY MFR USE ITEM 338833

## (undated) DEVICE — ENDO CARRY-ON PROCEDURE KIT INCLUDES ENZYMATIC SPONGE, GAUZE, BIOHAZARD LABEL, TRAY, LUBRICANT, DIRTY SCOPE LABEL, WATER LABEL, TRAY, DRAWSTRING PAD, AND DEFENDO 4-PIECE KIT.: Brand: ENDO CARRY-ON PROCEDURE KIT

## (undated) DEVICE — MAJ-1414 SINGLE USE ADPATER BIOPSY VALV: Brand: SINGLE USE ADAPTOR BIOPSY VALVE

## (undated) DEVICE — [FOUR SPIKE IRRIGATOR SET,  NON-PYROGENIC FLUID PATH,  DO NOT USE IF PACKAGE IS DAMAGED]

## (undated) DEVICE — SOLUTION IRRIG 3000ML 0.9% SOD CHL FLX CONT 0797208] ICU MEDICAL INC]

## (undated) DEVICE — MEDI-VAC NON-CONDUCTIVE SUCTION TUBING: Brand: CARDINAL HEALTH